# Patient Record
Sex: FEMALE | Race: OTHER | NOT HISPANIC OR LATINO
[De-identification: names, ages, dates, MRNs, and addresses within clinical notes are randomized per-mention and may not be internally consistent; named-entity substitution may affect disease eponyms.]

---

## 2017-05-01 ENCOUNTER — APPOINTMENT (OUTPATIENT)
Dept: SPINE | Facility: CLINIC | Age: 57
End: 2017-05-01

## 2017-05-01 VITALS
SYSTOLIC BLOOD PRESSURE: 137 MMHG | HEART RATE: 68 BPM | BODY MASS INDEX: 24.8 KG/M2 | WEIGHT: 140 LBS | HEIGHT: 63 IN | DIASTOLIC BLOOD PRESSURE: 84 MMHG

## 2017-05-01 RX ORDER — PANTOPRAZOLE 40 MG/1
40 TABLET, DELAYED RELEASE ORAL
Refills: 0 | Status: ACTIVE | COMMUNITY

## 2017-10-07 ENCOUNTER — RESULT REVIEW (OUTPATIENT)
Age: 57
End: 2017-10-07

## 2018-05-18 ENCOUNTER — APPOINTMENT (OUTPATIENT)
Dept: SPINE | Facility: CLINIC | Age: 58
End: 2018-05-18
Payer: MEDICAID

## 2018-05-18 VITALS — SYSTOLIC BLOOD PRESSURE: 131 MMHG | DIASTOLIC BLOOD PRESSURE: 83 MMHG | HEART RATE: 69 BPM

## 2018-05-18 VITALS
HEART RATE: 60 BPM | WEIGHT: 140 LBS | SYSTOLIC BLOOD PRESSURE: 163 MMHG | BODY MASS INDEX: 24.8 KG/M2 | HEIGHT: 63 IN | DIASTOLIC BLOOD PRESSURE: 102 MMHG

## 2018-05-18 PROCEDURE — 99213 OFFICE O/P EST LOW 20 MIN: CPT

## 2018-06-04 ENCOUNTER — APPOINTMENT (OUTPATIENT)
Dept: SPINE | Facility: CLINIC | Age: 58
End: 2018-06-04

## 2018-06-22 ENCOUNTER — EMERGENCY (EMERGENCY)
Facility: HOSPITAL | Age: 58
LOS: 1 days | End: 2018-06-22
Attending: EMERGENCY MEDICINE
Payer: MEDICAID

## 2018-06-22 VITALS
WEIGHT: 139.99 LBS | HEART RATE: 61 BPM | OXYGEN SATURATION: 98 % | RESPIRATION RATE: 18 BRPM | DIASTOLIC BLOOD PRESSURE: 80 MMHG | SYSTOLIC BLOOD PRESSURE: 165 MMHG | TEMPERATURE: 98 F

## 2018-06-22 DIAGNOSIS — Z98.51 TUBAL LIGATION STATUS: Chronic | ICD-10-CM

## 2018-06-22 PROCEDURE — 99283 EMERGENCY DEPT VISIT LOW MDM: CPT

## 2018-06-22 RX ORDER — IBUPROFEN 200 MG
600 TABLET ORAL ONCE
Qty: 0 | Refills: 0 | Status: COMPLETED | OUTPATIENT
Start: 2018-06-22 | End: 2018-06-22

## 2018-06-22 RX ADMIN — Medication 600 MILLIGRAM(S): at 09:12

## 2018-06-22 NOTE — ED ADULT NURSE NOTE - OBJECTIVE STATEMENT
Pt is a 57 yo female with the co left 1st toe pain that started last night, pt reports taking motrin at home last night that helped with the pain but it returned this am. Pt states that she started a new antibiotics for UTI and began feeling this pa8in, she denies anything like this happening to her in the past. She denies any CP or SOB no N/V/D no cough fever or chills.

## 2018-06-22 NOTE — ED PROVIDER NOTE - OBJECTIVE STATEMENT
58 YOF recently on abx for UTI p/w right first MTP joint pain and tenderness since last night. Pt denies any fevers, chills, back pain, chest pain, n/v/d. No hx of gout, no previous episodes. 58 YOF recently on abx for UTI p/w right first MTP joint pain and tenderness since last night. Pt denies any fevers, chills, back pain, chest pain, n/v/d. No hx of gout, no previous episodes.    Caraballo:  here for joint pain

## 2018-06-22 NOTE — ED PROVIDER NOTE - PHYSICAL EXAMINATION
Caraballo:  General: No distress.  Mentation at baseline.   HEENT: WNL  Chest/Lungs: CTAB, No wheeze, No retractions, No increased work of breathing, Normal rate  Heart: S1S2 RRR, No M/R/G, Pules equal Bilaterally in upper and lower extremities distally  Abd: soft, NT/ND, No guarding, No rebound.  No hernias, no palpable masses.  Extrem: FROM in all joints, no significant edema noted, No ulcers.  Cap refil < 2sec.  Skin: No rash noted, warm dry.  Neuro:  Grossly normal.  No difficulty ambulating. No focal deficits.  Psychiatric: No evidence of delusions. No SI/HI.

## 2018-06-22 NOTE — ED PROVIDER NOTE - NS ED ROS FT
CONSTITUTIONAL: No fevers, no chills  Eyes: no visual changes  Ears: no ear drainage, no ear pain  Nose: no nasal congestion  Mouth/Throat: no sore throat  Cardiovascular: No Chest pain  Respiratory: No SOB  Gastrointestinal: No n/v/d, no abd pain  Genitourinary: no dysuria, no hematuria  SKIN: no rashes.  NEURO: no headache  PSYCHIATRIC: no known mental health issues.  MSK: toe pain.

## 2018-06-22 NOTE — ED PROVIDER NOTE - CARE PLAN
Principal Discharge DX:	Gout  Assessment and plan of treatment:	1. Return to ED for worsening, progressive or any other concerning symptoms   2. Follow up with your primary care doctor in 2-3days  3. Take motrin 600mg every 6 hours as needed for pain

## 2018-06-22 NOTE — ED PROVIDER NOTE - ATTENDING CONTRIBUTION TO CARE
Ilya:  I have independently evaluated the patient and have documented in the appropriate sections above.  I agree with the exam and plan as noted above.

## 2018-06-22 NOTE — ED PROVIDER NOTE - PLAN OF CARE
1. Return to ED for worsening, progressive or any other concerning symptoms   2. Follow up with your primary care doctor in 2-3days  3. Take motrin 600mg every 6 hours as needed for pain

## 2019-05-13 ENCOUNTER — APPOINTMENT (OUTPATIENT)
Dept: SPINE | Facility: CLINIC | Age: 59
End: 2019-05-13
Payer: MEDICAID

## 2019-05-13 VITALS
DIASTOLIC BLOOD PRESSURE: 86 MMHG | HEART RATE: 78 BPM | BODY MASS INDEX: 26.4 KG/M2 | SYSTOLIC BLOOD PRESSURE: 124 MMHG | HEIGHT: 63 IN | WEIGHT: 149 LBS

## 2019-05-13 PROCEDURE — 99213 OFFICE O/P EST LOW 20 MIN: CPT

## 2019-05-13 NOTE — PHYSICAL EXAM
[FreeTextEntry5] : awake alert oriented [FreeTextEntry6] : no pronator drift [Abnormal Walk] : normal gait

## 2019-05-13 NOTE — PHYSICAL EXAM
[Abnormal Walk] : normal gait [FreeTextEntry5] : awake alert oriented [FreeTextEntry6] : no pronator drift

## 2019-05-13 NOTE — ASSESSMENT
[FreeTextEntry1] : no change in incidentalvexity meningioma. Neurologically intact followup MRI scan in one year

## 2019-05-13 NOTE — REASON FOR VISIT
[Follow-Up: _____] : a [unfilled] follow-up visit [FreeTextEntry1] : Surveillence of meningioma with follow up MRI Brain w and wo IVC

## 2019-07-21 ENCOUNTER — EMERGENCY (EMERGENCY)
Facility: HOSPITAL | Age: 59
LOS: 1 days | Discharge: HOME | End: 2019-07-21
Attending: STUDENT IN AN ORGANIZED HEALTH CARE EDUCATION/TRAINING PROGRAM
Payer: MEDICAID

## 2019-07-21 ENCOUNTER — EMERGENCY (EMERGENCY)
Facility: HOSPITAL | Age: 59
LOS: 1 days | Discharge: HOME | End: 2019-07-21
Attending: EMERGENCY MEDICINE
Payer: MEDICAID

## 2019-07-21 VITALS
SYSTOLIC BLOOD PRESSURE: 149 MMHG | HEIGHT: 63 IN | HEART RATE: 91 BPM | RESPIRATION RATE: 20 BRPM | DIASTOLIC BLOOD PRESSURE: 92 MMHG | WEIGHT: 149.03 LBS | TEMPERATURE: 98 F | OXYGEN SATURATION: 99 %

## 2019-07-21 VITALS
SYSTOLIC BLOOD PRESSURE: 148 MMHG | HEIGHT: 63 IN | OXYGEN SATURATION: 99 % | DIASTOLIC BLOOD PRESSURE: 85 MMHG | WEIGHT: 149.03 LBS | HEART RATE: 75 BPM | TEMPERATURE: 97 F | RESPIRATION RATE: 17 BRPM

## 2019-07-21 VITALS
OXYGEN SATURATION: 98 % | HEART RATE: 72 BPM | SYSTOLIC BLOOD PRESSURE: 129 MMHG | DIASTOLIC BLOOD PRESSURE: 80 MMHG | TEMPERATURE: 98 F | RESPIRATION RATE: 16 BRPM

## 2019-07-21 VITALS
DIASTOLIC BLOOD PRESSURE: 71 MMHG | HEART RATE: 63 BPM | RESPIRATION RATE: 16 BRPM | TEMPERATURE: 97 F | SYSTOLIC BLOOD PRESSURE: 118 MMHG | OXYGEN SATURATION: 99 %

## 2019-07-21 DIAGNOSIS — Z98.51 TUBAL LIGATION STATUS: Chronic | ICD-10-CM

## 2019-07-21 LAB
ALBUMIN SERPL ELPH-MCNC: 4.2 G/DL — SIGNIFICANT CHANGE UP (ref 3.3–5)
ALP SERPL-CCNC: 83 U/L — SIGNIFICANT CHANGE UP (ref 40–120)
ALT FLD-CCNC: 14 U/L — SIGNIFICANT CHANGE UP (ref 10–45)
ANION GAP SERPL CALC-SCNC: 14 MMOL/L — SIGNIFICANT CHANGE UP (ref 5–17)
AST SERPL-CCNC: 18 U/L — SIGNIFICANT CHANGE UP (ref 10–40)
BASE EXCESS BLDV CALC-SCNC: 0.5 MMOL/L — SIGNIFICANT CHANGE UP (ref -2–2)
BASOPHILS # BLD AUTO: 0 K/UL — SIGNIFICANT CHANGE UP (ref 0–0.2)
BASOPHILS NFR BLD AUTO: 0.4 % — SIGNIFICANT CHANGE UP (ref 0–2)
BILIRUB SERPL-MCNC: 0.6 MG/DL — SIGNIFICANT CHANGE UP (ref 0.2–1.2)
BUN SERPL-MCNC: 21 MG/DL — SIGNIFICANT CHANGE UP (ref 7–23)
CA-I SERPL-SCNC: 1.18 MMOL/L — SIGNIFICANT CHANGE UP (ref 1.12–1.3)
CALCIUM SERPL-MCNC: 9.3 MG/DL — SIGNIFICANT CHANGE UP (ref 8.4–10.5)
CHLORIDE BLDV-SCNC: 106 MMOL/L — SIGNIFICANT CHANGE UP (ref 96–108)
CHLORIDE SERPL-SCNC: 101 MMOL/L — SIGNIFICANT CHANGE UP (ref 96–108)
CO2 BLDV-SCNC: 26 MMOL/L — SIGNIFICANT CHANGE UP (ref 22–30)
CO2 SERPL-SCNC: 21 MMOL/L — LOW (ref 22–31)
CREAT SERPL-MCNC: 0.88 MG/DL — SIGNIFICANT CHANGE UP (ref 0.5–1.3)
EOSINOPHIL # BLD AUTO: 0.2 K/UL — SIGNIFICANT CHANGE UP (ref 0–0.5)
EOSINOPHIL NFR BLD AUTO: 2.1 % — SIGNIFICANT CHANGE UP (ref 0–6)
GAS PNL BLDV: 135 MMOL/L — SIGNIFICANT CHANGE UP (ref 135–145)
GAS PNL BLDV: SIGNIFICANT CHANGE UP
GLUCOSE BLDV-MCNC: 169 MG/DL — HIGH (ref 70–99)
GLUCOSE SERPL-MCNC: 167 MG/DL — HIGH (ref 70–99)
HCO3 BLDV-SCNC: 25 MMOL/L — SIGNIFICANT CHANGE UP (ref 21–29)
HCT VFR BLD CALC: 40.2 % — SIGNIFICANT CHANGE UP (ref 34.5–45)
HCT VFR BLDA CALC: 40 % — SIGNIFICANT CHANGE UP (ref 39–50)
HGB BLD CALC-MCNC: 13 G/DL — SIGNIFICANT CHANGE UP (ref 11.5–15.5)
HGB BLD-MCNC: 13.3 G/DL — SIGNIFICANT CHANGE UP (ref 11.5–15.5)
LACTATE BLDV-MCNC: 1.6 MMOL/L — SIGNIFICANT CHANGE UP (ref 0.7–2)
LIDOCAIN IGE QN: 39 U/L — SIGNIFICANT CHANGE UP (ref 7–60)
LYMPHOCYTES # BLD AUTO: 1.6 K/UL — SIGNIFICANT CHANGE UP (ref 1–3.3)
LYMPHOCYTES # BLD AUTO: 14.1 % — SIGNIFICANT CHANGE UP (ref 13–44)
MCHC RBC-ENTMCNC: 31 PG — SIGNIFICANT CHANGE UP (ref 27–34)
MCHC RBC-ENTMCNC: 33 GM/DL — SIGNIFICANT CHANGE UP (ref 32–36)
MCV RBC AUTO: 94 FL — SIGNIFICANT CHANGE UP (ref 80–100)
MONOCYTES # BLD AUTO: 0.4 K/UL — SIGNIFICANT CHANGE UP (ref 0–0.9)
MONOCYTES NFR BLD AUTO: 3.6 % — SIGNIFICANT CHANGE UP (ref 2–14)
NEUTROPHILS # BLD AUTO: 8.8 K/UL — HIGH (ref 1.8–7.4)
NEUTROPHILS NFR BLD AUTO: 79.7 % — HIGH (ref 43–77)
OTHER CELLS CSF MANUAL: 9 ML/DL — LOW (ref 18–22)
PCO2 BLDV: 43 MMHG — SIGNIFICANT CHANGE UP (ref 35–50)
PH BLDV: 7.38 — SIGNIFICANT CHANGE UP (ref 7.35–7.45)
PLATELET # BLD AUTO: 248 K/UL — SIGNIFICANT CHANGE UP (ref 150–400)
PO2 BLDV: 30 MMHG — SIGNIFICANT CHANGE UP (ref 25–45)
POTASSIUM BLDV-SCNC: 4.2 MMOL/L — SIGNIFICANT CHANGE UP (ref 3.5–5.3)
POTASSIUM SERPL-MCNC: 4.3 MMOL/L — SIGNIFICANT CHANGE UP (ref 3.5–5.3)
POTASSIUM SERPL-SCNC: 4.3 MMOL/L — SIGNIFICANT CHANGE UP (ref 3.5–5.3)
PROT SERPL-MCNC: 7.4 G/DL — SIGNIFICANT CHANGE UP (ref 6–8.3)
RBC # BLD: 4.27 M/UL — SIGNIFICANT CHANGE UP (ref 3.8–5.2)
RBC # FLD: 11.5 % — SIGNIFICANT CHANGE UP (ref 10.3–14.5)
SAO2 % BLDV: 50 % — LOW (ref 67–88)
SODIUM SERPL-SCNC: 136 MMOL/L — SIGNIFICANT CHANGE UP (ref 135–145)
TROPONIN T, HIGH SENSITIVITY RESULT: <6 NG/L — SIGNIFICANT CHANGE UP (ref 0–51)
TROPONIN T, HIGH SENSITIVITY RESULT: <6 NG/L — SIGNIFICANT CHANGE UP (ref 0–51)
WBC # BLD: 11.1 K/UL — HIGH (ref 3.8–10.5)
WBC # FLD AUTO: 11.1 K/UL — HIGH (ref 3.8–10.5)

## 2019-07-21 PROCEDURE — 84132 ASSAY OF SERUM POTASSIUM: CPT

## 2019-07-21 PROCEDURE — 99283 EMERGENCY DEPT VISIT LOW MDM: CPT | Mod: 25

## 2019-07-21 PROCEDURE — 85027 COMPLETE CBC AUTOMATED: CPT

## 2019-07-21 PROCEDURE — 99283 EMERGENCY DEPT VISIT LOW MDM: CPT

## 2019-07-21 PROCEDURE — 96374 THER/PROPH/DIAG INJ IV PUSH: CPT

## 2019-07-21 PROCEDURE — 83690 ASSAY OF LIPASE: CPT

## 2019-07-21 PROCEDURE — 96372 THER/PROPH/DIAG INJ SC/IM: CPT

## 2019-07-21 PROCEDURE — 93005 ELECTROCARDIOGRAM TRACING: CPT

## 2019-07-21 PROCEDURE — 80053 COMPREHEN METABOLIC PANEL: CPT

## 2019-07-21 PROCEDURE — 96375 TX/PRO/DX INJ NEW DRUG ADDON: CPT

## 2019-07-21 PROCEDURE — 93010 ELECTROCARDIOGRAM REPORT: CPT

## 2019-07-21 PROCEDURE — 99284 EMERGENCY DEPT VISIT MOD MDM: CPT

## 2019-07-21 PROCEDURE — 99284 EMERGENCY DEPT VISIT MOD MDM: CPT | Mod: 25

## 2019-07-21 PROCEDURE — 82947 ASSAY GLUCOSE BLOOD QUANT: CPT

## 2019-07-21 PROCEDURE — 73630 X-RAY EXAM OF FOOT: CPT | Mod: 26,LT

## 2019-07-21 PROCEDURE — 82330 ASSAY OF CALCIUM: CPT

## 2019-07-21 PROCEDURE — 70450 CT HEAD/BRAIN W/O DYE: CPT | Mod: 26

## 2019-07-21 PROCEDURE — 84484 ASSAY OF TROPONIN QUANT: CPT

## 2019-07-21 PROCEDURE — 82803 BLOOD GASES ANY COMBINATION: CPT

## 2019-07-21 PROCEDURE — 85014 HEMATOCRIT: CPT

## 2019-07-21 PROCEDURE — 73630 X-RAY EXAM OF FOOT: CPT

## 2019-07-21 PROCEDURE — 82435 ASSAY OF BLOOD CHLORIDE: CPT

## 2019-07-21 PROCEDURE — 83605 ASSAY OF LACTIC ACID: CPT

## 2019-07-21 PROCEDURE — 70450 CT HEAD/BRAIN W/O DYE: CPT

## 2019-07-21 PROCEDURE — 84295 ASSAY OF SERUM SODIUM: CPT

## 2019-07-21 RX ORDER — DIPHENHYDRAMINE HCL 50 MG
25 CAPSULE ORAL ONCE
Refills: 0 | Status: COMPLETED | OUTPATIENT
Start: 2019-07-21 | End: 2019-07-21

## 2019-07-21 RX ORDER — IBUPROFEN 200 MG
400 TABLET ORAL ONCE
Refills: 0 | Status: DISCONTINUED | OUTPATIENT
Start: 2019-07-21 | End: 2019-07-21

## 2019-07-21 RX ORDER — SODIUM CHLORIDE 9 MG/ML
1000 INJECTION INTRAMUSCULAR; INTRAVENOUS; SUBCUTANEOUS ONCE
Refills: 0 | Status: DISCONTINUED | OUTPATIENT
Start: 2019-07-21 | End: 2019-07-21

## 2019-07-21 RX ORDER — FAMOTIDINE 10 MG/ML
20 INJECTION INTRAVENOUS ONCE
Refills: 0 | Status: COMPLETED | OUTPATIENT
Start: 2019-07-21 | End: 2019-07-21

## 2019-07-21 RX ORDER — KETOROLAC TROMETHAMINE 30 MG/ML
15 SYRINGE (ML) INJECTION ONCE
Refills: 0 | Status: DISCONTINUED | OUTPATIENT
Start: 2019-07-21 | End: 2019-07-21

## 2019-07-21 RX ORDER — IBUPROFEN 200 MG
400 TABLET ORAL ONCE
Refills: 0 | Status: COMPLETED | OUTPATIENT
Start: 2019-07-21 | End: 2019-07-21

## 2019-07-21 RX ORDER — METOCLOPRAMIDE HCL 10 MG
10 TABLET ORAL ONCE
Refills: 0 | Status: COMPLETED | OUTPATIENT
Start: 2019-07-21 | End: 2019-07-21

## 2019-07-21 RX ORDER — OXYCODONE HYDROCHLORIDE 5 MG/1
5 TABLET ORAL ONCE
Refills: 0 | Status: DISCONTINUED | OUTPATIENT
Start: 2019-07-21 | End: 2019-07-21

## 2019-07-21 RX ADMIN — Medication 15 MILLIGRAM(S): at 08:18

## 2019-07-21 RX ADMIN — OXYCODONE HYDROCHLORIDE 5 MILLIGRAM(S): 5 TABLET ORAL at 06:55

## 2019-07-21 RX ADMIN — Medication 30 MILLILITER(S): at 12:13

## 2019-07-21 RX ADMIN — Medication 400 MILLIGRAM(S): at 06:58

## 2019-07-21 RX ADMIN — Medication 10 MILLIGRAM(S): at 11:12

## 2019-07-21 RX ADMIN — Medication 25 MILLIGRAM(S): at 11:12

## 2019-07-21 RX ADMIN — FAMOTIDINE 20 MILLIGRAM(S): 10 INJECTION INTRAVENOUS at 12:14

## 2019-07-21 RX ADMIN — Medication 40 MILLIGRAM(S): at 08:18

## 2019-07-21 NOTE — ED ADULT NURSE NOTE - OBJECTIVE STATEMENT
59 year old female PMH of menigioma presents to ED c/o headache, dizziness, N/V about 15 minutes after discharge form ED today.  She also c/o feeling like something is stuck in her throat.  Patient was seen and treated for gout and discharged.  She said about 15 minutes after discharge she became dizzy had a headache, nausea and 2 episodes of vomiting.  She denies: falls, fevers, chest pain, shortness of breath.

## 2019-07-21 NOTE — ED PROVIDER NOTE - ATTENDING CONTRIBUTION TO CARE
59 F w/ PMH of meningioma p/w nausea and vomiting and headache s/p prednisone. Pt states that she went home after being seen in the ED and D/c'd for gout.   Pt reports that she has a prior hx of mengioma.

## 2019-07-21 NOTE — ED ADULT NURSE NOTE - INTERVENTIONS DEFINITIONS
Physically safe environment: no spills, clutter or unnecessary equipment/Stretcher in lowest position, wheels locked, appropriate side rails in place/Provide visual cue, wrist band, yellow gown, etc.

## 2019-07-21 NOTE — ED PROVIDER NOTE - PHYSICAL EXAMINATION
GEN APPEARANCE: WDWN, alert and cooperative, non-toxic appearing and in NAD, uncomfortable appearing   HEAD: Atraumatic, normocephalic   EYES: PERRLa, EOMI, vision grossly intact.   EARS: Gross hearing intact.   NOSE: No nasal discharge, no external evidence of epistaxis.   NECK: Supple  CV: RRR, S1S2, no c/r/m/g. No cyanosis or pallor. Extremities warm, well perfused. Cap refill <2 seconds. No bruits.   LUNGS: CTAB. No wheezing. No rales. No rhonchi. No diminished breath sounds.   ABDOMEN: Soft, NTND. No guarding or rebound. No masses.   MSK: Spine appears normal, no spine point tenderness. No CVA ttp. No joint erythema or tenderness. Normal muscular development. Pelvis stable.  EXTREMITIES: No peripheral edema. No obvious joint or bony deformity. L 1st base erythematous tender warm   NEURO: Alert, follows commands. Weight bearing normal. Speech normal. Sensation and motor normal x4 extremities.   SKIN: Normal color for race, warm, dry and intact. No evidence of rash.  PSYCH: Normal mood and affect.

## 2019-07-21 NOTE — ED PROVIDER NOTE - SHIFT CHANGE DETAILS
pt signed out to me pending reassessment of pain, pt has hx of gout in the R toe now here w/ L great toe pain in the 1st MCP, took husbands allopurinol today,

## 2019-07-21 NOTE — ED PROVIDER NOTE - OBJECTIVE STATEMENT
59f w hx gout just discharged from ED ~30m ago c/o toe pain dx as acute gouty flare, meningioma followed by nsgy here now for n/v, headache and lightheadedness and feeling sweaty. Pt states she has similar headaches frequently 2/2 meningioma which she did not mention on previous visit, as was unrelated to her complaint. States vomited 2x. Also felt whole body numbness that has improved, most significant in RUE. Had been given NSAIDs for gout and states didn't eat anything since. Denies abd pain, fever, urinary sx. No cp or SOB, though feels sensation of choking in throat. States she was feeling lightheaded prior to receiving medications on last visit but medical staff not made aware.

## 2019-07-21 NOTE — ED PROVIDER NOTE - CLINICAL SUMMARY MEDICAL DECISION MAKING FREE TEXT BOX
Eros PGY3: 59F hx of gout presents with gout to L 1st toe worsening pain x4 days is able to ambulate, no trauma, falls, took 200mg motrin prior to ED arrival. exam vss non toxic appearing 1st L tender erythematous c/f gout vs fx will get xr pain control reassess Eros PGY3: 59F hx of gout presents with gout to L 1st toe worsening pain x4 days is able to ambulate, no trauma, falls, took 200mg motrin prior to ED arrival. exam vss non toxic appearing 1st L tender erythematous c/f gout vs fx will get xr pain control reassess    Patient presenting with acute gout flare. Will provide analgesia, xray of foot, likely discharge home with NSAIDs and follow up with PMD.  ATTG: Dr. Bullard

## 2019-07-21 NOTE — ED ADULT NURSE REASSESSMENT NOTE - NS ED NURSE REASSESS COMMENT FT1
Patient awake and alert with family at bedside aware of plan of care and reports pain in left great toe.

## 2019-07-21 NOTE — ED PROVIDER NOTE - CLINICAL SUMMARY MEDICAL DECISION MAKING FREE TEXT BOX
59f w hx gout just discharged from ED ~30m ago c/o toe pain dx as acute gouty flare, meningioma followed by nsgy here now for n/v, headache and lightheadedness and feeling sweaty. Pt appears well NAD w nl VS. Soft NT abd and non-focal neuro exam. Sx possibly migrainous in nature vs gastritic given recent NSAID administration. Will check ekg, labs and cth, treat sx, r/a

## 2019-07-21 NOTE — ED PROVIDER NOTE - PROGRESS NOTE DETAILS
Elizabeth Goldberger PGY-3: CT unrem other than for stable calcified meningioma. Pt feeling better and appears well. States has had similar sx in past from steroids. Will dc steroids that were sent for gout and dc

## 2019-07-21 NOTE — ED PROVIDER NOTE - NSFOLLOWUPINSTRUCTIONS_ED_ALL_ED_FT
What is gout?  Gout is a form of arthritis. It can cause pain and swelling in the joints. At first, it tends to affect only one joint – most frequently the big toe. It happens in people who have too much uric acid in the blood. Uric acid is a chemical that is produced when the body breaks down certain foods. Uric acid can form sharp needle-like crystals that build up in the joints and cause pain. Uric acid crystals can also form inside the tubes that carry urine from the kidneys to the bladder. These crystals can turn into "kidney stones" that can cause pain and problems with the flow of urine.    What are the symptoms of gout?  People with gout get sudden "flares" or attacks of severe pain, most often the big toe, ankle, or knee. Often the joint also turns red and swells. Usually, only one joint is affected, but some people have pain in more than one joint. Gout flares tend to happen more often during the night.    The pain from gout can be extreme. The pain and swelling are worst at the beginning of a gout flare. The symptoms then get better within a few days to weeks. It is not clear how the body "turns off" a gout flare.    Is there a test for gout?  Yes. To test you for gout, your doctor or nurse can take a sample of fluid from the joint that is in pain. If he or she finds typical gout crystals in the fluid, then you have gout. Even without checking fluid from a joint, the doctor or nurse might still strongly suspect gout if:    ?You have had pain and swelling in one joint, especially the joint at the base of the big toe    ?Your symptoms completely go away between flares, at least when you first start having them    ?Your blood tests show high levels of uric acid    How is gout treated?  There are a few medicines that can reduce the pain and swelling caused by gout. When you find one that works for you, make sure to keep it on hand all the time. That way you can take it as soon you feel a flare starting. Gout medicines work best if you take them as soon as symptoms start.    The medicines used to treat gout flares include:    ?NSAIDs – This is a large group of medicines that includes ibuprofen (sample brand names: Advil, Motrin) and indomethacin (brand name: Indocin). NSAIDs might not be safe for people with kidney or liver disease, or for people who have bleeding problems.    ?Colchicine – This medicine helps with gout but it can also cause diarrhea, nausea, vomiting, and stomach pain.    ?Steroids – Steroids can reduce swelling and pain. These steroids are not the kind that athletes take to build up muscle. Steroids can be taken as pills or as shots.    Are there medicines to prevent gout flares?  Yes, there are medicines that can reduce the chances of having future gout flares. Most people who have repeated or severe flares of gout need to take these medicines. In general, they all work by reducing the amount of uric acid in the blood. Examples of these medicines include allopurinol (brand names: Aloprim, Zyloprim), febuxostat (brand name: Uloric), and probenecid. People with severe gout can also get a medicine called pegloticase (brand name: Krystexxa), which is given through a vein. This medicine can cause an allergic reaction in some people.    If you take one of the medicines to prevent gout, your doctor or nurse will want to make sure you use it safely. He or she might also want to check that your uric acid level gets low enough to dissolve the gout crystals. Allopurinol, febuxostat, and probenecid can actually increase gout flares when you first start taking them. To prevent these flares, your doctor or nurse might suggest that you take low doses of colchicine when you start the medicines. This will give the gout crystals time to dissolve, and that will put a stop to the flares over time. If you do have a gout flare, it's important to keep taking your daily medicines normally.    Your doctor will check your uric acid levels regularly. This is to make sure the medicines are working and you are taking the right dose.    Can I do anything on my own to prevent gout flares?  Yes. If you are overweight, losing weight can help relieve gout.    It's not clear that following a specific diet plan will help with gout symptoms. But eating a balanced diet can help improve your overall health. It can also help you lose weight, if you are overweight.    In general, a healthy diet includes plenty of fruits, vegetables, whole grains, and low-fat dairy products. You should also limit sugary drinks and alcohol, which can make gout flares worse.    Some people with gout also have other health problems, such as heart disease, high blood pressure, kidney disease, or obesity. If you have any of these issues, it's important to work with your doctor to manage them. This can help improve your overall health and might also help with your gout. You were seen in the emergency department for toe pain. Please follow up with your primary doctor in the next 3-5 days. Take prednisone 40 mg once a day for the next 5 days. Also take motrin 800mg up to 3 times a day for continued pain. Return to the emergency department immediately if you experience fever, redness of the toe or any other concerning symptoms.        What is gout?  Gout is a form of arthritis. It can cause pain and swelling in the joints. At first, it tends to affect only one joint – most frequently the big toe. It happens in people who have too much uric acid in the blood. Uric acid is a chemical that is produced when the body breaks down certain foods. Uric acid can form sharp needle-like crystals that build up in the joints and cause pain. Uric acid crystals can also form inside the tubes that carry urine from the kidneys to the bladder. These crystals can turn into "kidney stones" that can cause pain and problems with the flow of urine.    What are the symptoms of gout?  People with gout get sudden "flares" or attacks of severe pain, most often the big toe, ankle, or knee. Often the joint also turns red and swells. Usually, only one joint is affected, but some people have pain in more than one joint. Gout flares tend to happen more often during the night.    The pain from gout can be extreme. The pain and swelling are worst at the beginning of a gout flare. The symptoms then get better within a few days to weeks. It is not clear how the body "turns off" a gout flare.    Is there a test for gout?  Yes. To test you for gout, your doctor or nurse can take a sample of fluid from the joint that is in pain. If he or she finds typical gout crystals in the fluid, then you have gout. Even without checking fluid from a joint, the doctor or nurse might still strongly suspect gout if:    ?You have had pain and swelling in one joint, especially the joint at the base of the big toe    ?Your symptoms completely go away between flares, at least when you first start having them    ?Your blood tests show high levels of uric acid    How is gout treated?  There are a few medicines that can reduce the pain and swelling caused by gout. When you find one that works for you, make sure to keep it on hand all the time. That way you can take it as soon you feel a flare starting. Gout medicines work best if you take them as soon as symptoms start.    The medicines used to treat gout flares include:    ?NSAIDs – This is a large group of medicines that includes ibuprofen (sample brand names: Advil, Motrin) and indomethacin (brand name: Indocin). NSAIDs might not be safe for people with kidney or liver disease, or for people who have bleeding problems.    ?Colchicine – This medicine helps with gout but it can also cause diarrhea, nausea, vomiting, and stomach pain.    ?Steroids – Steroids can reduce swelling and pain. These steroids are not the kind that athletes take to build up muscle. Steroids can be taken as pills or as shots.    Are there medicines to prevent gout flares?  Yes, there are medicines that can reduce the chances of having future gout flares. Most people who have repeated or severe flares of gout need to take these medicines. In general, they all work by reducing the amount of uric acid in the blood. Examples of these medicines include allopurinol (brand names: Aloprim, Zyloprim), febuxostat (brand name: Uloric), and probenecid. People with severe gout can also get a medicine called pegloticase (brand name: Krystexxa), which is given through a vein. This medicine can cause an allergic reaction in some people.    If you take one of the medicines to prevent gout, your doctor or nurse will want to make sure you use it safely. He or she might also want to check that your uric acid level gets low enough to dissolve the gout crystals. Allopurinol, febuxostat, and probenecid can actually increase gout flares when you first start taking them. To prevent these flares, your doctor or nurse might suggest that you take low doses of colchicine when you start the medicines. This will give the gout crystals time to dissolve, and that will put a stop to the flares over time. If you do have a gout flare, it's important to keep taking your daily medicines normally.    Your doctor will check your uric acid levels regularly. This is to make sure the medicines are working and you are taking the right dose.    Can I do anything on my own to prevent gout flares?  Yes. If you are overweight, losing weight can help relieve gout.    It's not clear that following a specific diet plan will help with gout symptoms. But eating a balanced diet can help improve your overall health. It can also help you lose weight, if you are overweight.    In general, a healthy diet includes plenty of fruits, vegetables, whole grains, and low-fat dairy products. You should also limit sugary drinks and alcohol, which can make gout flares worse.    Some people with gout also have other health problems, such as heart disease, high blood pressure, kidney disease, or obesity. If you have any of these issues, it's important to work with your doctor to manage them. This can help improve your overall health and might also help with your gout.

## 2019-07-21 NOTE — ED PROVIDER NOTE - NSFOLLOWUPCLINICS_GEN_ALL_ED_FT
[facility] - Primary Care  Primary Care  [address] [place] [address]  Phone: [phone]  Fax:   Follow Up Time: 1-3 Days

## 2019-07-21 NOTE — ED ADULT NURSE NOTE - OBJECTIVE STATEMENT
c/o pain to left great toe; pt states it is gout, which she has had in the past; she took allopurinol and Motrin at home that did not help; toe has erythema and minor swelling; pt states she is unable to walk; denies n/v or trauma to area. c/o pain to left great toe; pt states it is gout, which she has had in the past; she took allopurinol and 200 mg Motrin at home that did not help; toe has erythema and minor swelling; pt states she is unable to walk; denies n/v or trauma to area.

## 2019-07-21 NOTE — ED PROVIDER NOTE - PROGRESS NOTE DETAILS
Elizabeth Goldberger PGY-3: pt signed out to me pending reassessment of pain. States will w pain only mildly improved after motrin 400 and oxy 5. Will give another dose motrin to complete to high-dose and r/a Elizabeth Goldberger PGY-3: pt signed out to me pending reassessment of pain. States will w pain only mildly improved after motrin 400 and oxy 5. Will give low-dose IM toradol and r/a Savanah Mckoy MD  Pt was counselled on gout. Pt will have crutches and then discharge home.  has gout as well Elizabeth Goldberger PGY-3: pt signed out to me pending reassessment of pain. States will w pain only mildly improved after motrin 400 and oxy 5. Will give low-dose IM toradol + steroid and r/a

## 2019-07-21 NOTE — ED ADULT TRIAGE NOTE - CHIEF COMPLAINT QUOTE
pt c/o n/v with dizziness and LUE numbness s/p taking meds for gout.  pt was d/c'd approz 20 min ago for gout tx.

## 2019-09-05 ENCOUNTER — APPOINTMENT (OUTPATIENT)
Dept: RHEUMATOLOGY | Facility: CLINIC | Age: 59
End: 2019-09-05

## 2019-09-09 ENCOUNTER — EMERGENCY (EMERGENCY)
Facility: HOSPITAL | Age: 59
LOS: 1 days | Discharge: HOME | End: 2019-09-09
Attending: EMERGENCY MEDICINE
Payer: MEDICAID

## 2019-09-09 VITALS
WEIGHT: 143.08 LBS | RESPIRATION RATE: 18 BRPM | SYSTOLIC BLOOD PRESSURE: 134 MMHG | HEIGHT: 63 IN | DIASTOLIC BLOOD PRESSURE: 86 MMHG | OXYGEN SATURATION: 100 % | TEMPERATURE: 98 F | HEART RATE: 89 BPM

## 2019-09-09 DIAGNOSIS — Z98.51 TUBAL LIGATION STATUS: Chronic | ICD-10-CM

## 2019-09-09 PROBLEM — M10.9 GOUT, UNSPECIFIED: Chronic | Status: ACTIVE | Noted: 2019-07-21

## 2019-09-09 PROCEDURE — 73620 X-RAY EXAM OF FOOT: CPT | Mod: 26,LT

## 2019-09-09 PROCEDURE — 99283 EMERGENCY DEPT VISIT LOW MDM: CPT

## 2019-09-09 PROCEDURE — 73620 X-RAY EXAM OF FOOT: CPT

## 2019-09-09 RX ORDER — INDOMETHACIN 50 MG
50 CAPSULE ORAL ONCE
Refills: 0 | Status: COMPLETED | OUTPATIENT
Start: 2019-09-09 | End: 2019-09-09

## 2019-09-09 RX ORDER — INDOMETHACIN 50 MG
1 CAPSULE ORAL
Qty: 30 | Refills: 0
Start: 2019-09-09 | End: 2019-09-18

## 2019-09-09 RX ADMIN — Medication 50 MILLIGRAM(S): at 05:39

## 2019-09-09 NOTE — ED PROVIDER NOTE - PROGRESS NOTE DETAILS
Patient reports toe pain has resolved. Will follow up with PMD regarding her recurrent gout flares. Strict return precautions given.

## 2019-09-09 NOTE — ED ADULT NURSE NOTE - OBJECTIVE STATEMENT
58 y/o Female presenting to the ED ambulatory, A&Ox3, complaining of left foot pain due to gout. Pt reports the pain has been increasingly getting worse with no relief from pain medication at home. Left foot appears erythematous. Pt denies chest pain, shortness of breath, fever, chills, numbness, tingling. Pt able to ambulate steadily. Pulses present in effected extremity. Cap refill 2 seconds. Safety and comfort measures provided and maintained, bed in lowest position. Awaiting MD orders at this time.

## 2019-09-09 NOTE — ED PROVIDER NOTE - PATIENT PORTAL LINK FT
You can access the FollowMyHealth Patient Portal offered by [facility] by registering at the following website: [email or web address]. By joining [facility]’s FollowMyHealth portal, you will also be able to view your health information using other applications (apps) compatible with our system.

## 2019-09-09 NOTE — ED PROVIDER NOTE - ATTENDING CONTRIBUTION TO CARE
59F presenting to the ED w/ L 1st toe pain, non-radiating, exacerbated by palpation, alleviated with rest, feels like prior gout flares, sharp. Denies any trauma to the area. States that she has had this before. Tried Ibuprofen 600mg at home without relief.    Chart review shows prior visits for gout flares similar to this one.     ROS:  GEN: (-) fevers/chills  HEENT: (-) visual change  NECK: (-) stiffness, (-) swelling  RESP: (-) shortness of breath, (-) cough, (-) sputum  CV: (-) chest pain, (-) palpitations  GI: (-) nausea, (-) vomiting, (-) pain, (-) constipation, (-) diarrhea  : (-) frequency/urgency, (-) hematuria, (-) dysuria, (-) incontinence, (-) discharge  EXT: (-) edema, (-) cyanosis, (+) toe pain  ENDO: (-) heat/cold intolerance, (-) polyuria  NEURO: (-) paresthesias, (-) weakness, (-) headache, (-) dizziness, (-) syncope  MSK: no muscle pain   SKIN: (-) erythema    PMHx: Gout, Meningioma  PSHx: Tubal ligation  Allergies: ?Colchicine, ?Prednisone  SocHx: Denies smoking, drinking, drugs.    PHYSICAL EXAMINATION:  VITALS REVIEWED.  VS slightly hypertensive, otherwise normal  GENERALIZED APPEARANCE:  Comfortable, no acute distress, ambulating without difficulty  SKIN:  Warm, dry, no cyanosis  HEAD:  No obvious scalp lesions  EYES:  Conjunctiva pink, no icterus  ENMT:  Mucus membranes moist, no stridor  NECK:  Supple, non-tender  CHEST AND RESPIRATORY:  Clear to auscultation B/L, good air entry B/L, equal chest expansion  HEART AND CARDIOVASCULAR:  Regular rate, no obvious murmur  ABDOMEN AND GI:  Soft, non-tender, non-distended.  No rebound, no guarding  EXTREMITIES:  No deformity, edema, or calf tenderness;  L toe joint tender on palpation without evidence of swelling or warmth, no erythema, no lesions    Impression:  Mild gout flare, r/o fx, r/o effusion/gas although unlikely  Indomethacin, XR, re-eval  NEURO: AAOx3, gross motor and sensory intact

## 2019-09-09 NOTE — ED PROVIDER NOTE - NS ED ROS FT
CONSTITUTIONAL:  No weight loss, fever, chills, weakness or fatigue.  HEENT:  Eyes:  No visual loss, blurred vision, double vision or yellow sclerae. Ears, Nose, Throat:  No hearing loss, sneezing, congestion, runny nose or sore throat.  SKIN:  No rash or itching.  CARDIOVASCULAR:  No chest pain, chest pressure or chest discomfort. No palpitations or edema.  RESPIRATORY:  No shortness of breath, cough or sputum.  GASTROINTESTINAL:  No anorexia, nausea, vomiting or diarrhea. No abdominal pain or blood.  GENITOURINARY:  Denies hematuria, dysuria.   NEUROLOGICAL:  No headache, dizziness, syncope, paralysis, ataxia, numbness or tingling in the extremities. No change in bowel or bladder control.  MUSCULOSKELETAL:  No muscle, back pain, joint pain or stiffness. + L toe pain  HEMATOLOGIC:  No anemia, bleeding or bruising.  LYMPHATICS:  No enlarged nodes. No history of splenectomy.  PSYCHIATRIC:  No history of depression or anxiety.  ENDOCRINOLOGIC:  No reports of sweating, cold or heat intolerance. No polyuria or polydipsia.  ALLERGIES:  No history of asthma, hives, eczema or rhinitis.

## 2019-09-09 NOTE — ED PROVIDER NOTE - NSFOLLOWUPINSTRUCTIONS_ED_ALL_ED_FT
We prescribed you Indomethacin.  Take 1 pill every 8 hours until symptoms have resolved for 2-3 days.  Make sure to take this medication with food.     Gout  Image   Gout is painful swelling that can occur in some of your joints. Gout is a type of arthritis. This condition is caused by having too much uric acid in your body. Uric acid is a chemical that forms when your body breaks down substances called purines. Purines are important for building body proteins.    When your body has too much uric acid, sharp crystals can form and build up inside your joints. This causes pain and swelling. Gout attacks can happen quickly and be very painful (acute gout). Over time, the attacks can affect more joints and become more frequent (chronic gout). Gout can also cause uric acid to build up under your skin and inside your kidneys.    What are the causes?  This condition is caused by too much uric acid in your blood. This can occur because:  Your kidneys do not remove enough uric acid from your blood. This is the most common cause.  Your body makes too much uric acid. This can occur with some cancers and cancer treatments. It can also occur if your body is breaking down too many red blood cells (hemolytic anemia).  You eat too many foods that are high in purines. These foods include organ meats and some seafood. Alcohol, especially beer, is also high in purines.  A gout attack may be triggered by trauma or stress.    What increases the risk?  This condition is more likely to develop in people who:  Have a family history of gout.  Are male and middle-aged.  Are female and have gone through menopause.  Are obese.  Frequently drink alcohol, especially beer.  Are dehydrated.  Lose weight too quickly.  Have an organ transplant.  Have lead poisoning.  Take certain medicines, including aspirin, cyclosporine, diuretics, levodopa, and niacin.  Have kidney disease or psoriasis.  What are the signs or symptoms?  ImageAn attack of acute gout happens quickly. It usually occurs in just one joint. The most common place is the big toe. Attacks often start at night. Other joints that may be affected include joints of the feet, ankle, knee, fingers, wrist, or elbow. Symptoms may include:  Severe pain.  Warmth.  Swelling.  Stiffness.  Tenderness. The affected joint may be very painful to touch.  Shiny, red, or purple skin.  Chills and fever.  Chronic gout may cause symptoms more frequently. More joints may be involved. You may also have white or yellow lumps (tophi) on your hands or feet or in other areas near your joints.    How is this diagnosed?  This condition is diagnosed based on your symptoms, medical history, and physical exam. You may have tests, such as:  Blood tests to measure uric acid levels.  Removal of joint fluid with a needle (aspiration) to look for uric acid crystals.  X-rays to look for joint damage.  How is this treated?  Treatment for this condition has two phases: treating an acute attack and preventing future attacks. Acute gout treatment may include medicines to reduce pain and swelling, including:  NSAIDs.  Steroids. These are strong anti-inflammatory medicines that can be taken by mouth (orally) or injected into a joint.  Colchicine. This medicine relieves pain and swelling when it is taken soon after an attack. It can be given orally or through an IV tube.  Preventive treatment may include:  Daily use of smaller doses of NSAIDs or colchicine.  Use of a medicine that reduces uric acid levels in your blood.  Changes to your diet. You may need to see a specialist about healthy eating (dietitian).  Follow these instructions at home:  During a gout attack     Image   If directed, apply ice to the affected area:  Put ice in a plastic bag.  Place a towel between your skin and the bag.  Leave the ice on for 20 minutes, 2–3 times a day.  Rest the joint as much as possible. If the affected joint is in your leg, you may be given crutches to use.  Raise (elevate) the affected joint above the level of your heart as often as possible.  Drink enough fluids to keep your urine pale yellow.  Take over-the-counter and prescription medicines only as told by your health care provider.  Do not drive or operate heavy machinery while taking prescription pain medicine.  Follow instructions from your health care provider about eating or drinking restrictions.  Return to your normal activities as told by your health care provider. Ask your health care provider what activities are safe for you.  Avoiding future gout attacks     Image   Follow a low-purine diet as told by your dietitian or health care provider. Avoid foods and drinks that are high in purines, including liver, kidney, anchovies, asparagus, herring, mushrooms, mussels, and beer.  Limit alcohol intake to no more than 1 drink a day for nonpregnant women and 2 drinks a day for men. One drink equals 12 oz of beer, 5 oz of wine, or 1½ oz of hard liquor.  Maintain a healthy weight or lose weight if you are overweight. If you want to lose weight, talk with your health care provider. It is important that you do not lose weight too quickly.  Start or maintain an exercise program as told by your health care provider.  Drink enough fluids to keep your urine pale yellow.  Take over-the-counter and prescription medicines only as told by your health care provider.  Keep all follow-up visits as told by your health care provider. This is important.  Contact a health care provider if:  You have another gout attack.  You continue to have symptoms of a gout attack after10 days of treatment.  You have side effects from your medicines.  You have chills or a fever.  You have burning pain when you urinate.  You have pain in your lower back or belly.  Get help right away if:  You have severe or uncontrolled pain.  You cannot urinate.  This information is not intended to replace advice given to you by your health care provider. Make sure you discuss any questions you have with your health care provider.

## 2019-09-09 NOTE — ED PROVIDER NOTE - PHYSICAL EXAMINATION
GENERAL: NAD, lying in bed comfortably  HEAD:  Atraumatic, Normocephalic  EYES: EOMI, PERRLA, conjunctiva and sclera clear  ENT: Moist mucous membranes  NECK: Supple, No JVD  CHEST/LUNG: Clear to auscultation bilaterally; No rales, rhonchi, wheezing, or rubs. Unlabored respirations  HEART: Regular rate and rhythm; No murmurs, rubs, or gallops  ABDOMEN: Bowel sounds present; Soft, Nontender, Nondistended. No hepatomegaly  EXTREMITIES:  2+ Peripheral Pulses, brisk capillary refill. No clubbing, cyanosis, or edema.  L toe joint tender on palpation w/ no swelling or warmth.   NERVOUS SYSTEM:  Alert & Oriented X3, speech clear. No deficits   MSK: FROM all 4 extremities, full and equal strength  SKIN: No rashes or lesions

## 2019-09-09 NOTE — ED PROVIDER NOTE - OBJECTIVE STATEMENT
59F hx of gout presents with gout to L 1st toe.  Tried taking ibuprofen 600 w/ no relief. Reports allergy to prednisone and colchicine (gives facial swelling, headache and palpitations).

## 2020-05-21 ENCOUNTER — APPOINTMENT (OUTPATIENT)
Dept: SPINE | Facility: CLINIC | Age: 60
End: 2020-05-21

## 2020-06-01 ENCOUNTER — APPOINTMENT (OUTPATIENT)
Dept: SPINE | Facility: CLINIC | Age: 60
End: 2020-06-01
Payer: MEDICAID

## 2020-06-01 VITALS
HEART RATE: 77 BPM | WEIGHT: 149 LBS | BODY MASS INDEX: 26.4 KG/M2 | TEMPERATURE: 98.4 F | HEIGHT: 63 IN | OXYGEN SATURATION: 98 % | SYSTOLIC BLOOD PRESSURE: 156 MMHG | DIASTOLIC BLOOD PRESSURE: 82 MMHG | RESPIRATION RATE: 12 BRPM

## 2020-06-01 PROCEDURE — 99212 OFFICE O/P EST SF 10 MIN: CPT

## 2020-06-01 NOTE — ASSESSMENT
[FreeTextEntry1] : 60 year old female with no changes in morphology of left sided meningioma.  Asymptomatic.  Once year follow up in 2021 for repeat brain MRI w/wo.   She will report any changes in her condition  over the next year.  .

## 2020-06-01 NOTE — REASON FOR VISIT
[Follow-Up: _____] : a [unfilled] follow-up visit [Other: _____] : [unfilled] [FreeTextEntry1] : 60 year old female with a history of left sided meningioma, asymptomatic.  She has been followed by serial MRI of the brain scans. Today the follow up  MR shows no changes and  stable appearance of the meningioma according to the official report. I too to have reviewed the images do not see any appreciable change in size.  She continues to remain asymptomatic.

## 2020-11-06 ENCOUNTER — RESULT REVIEW (OUTPATIENT)
Age: 60
End: 2020-11-06

## 2021-02-23 ENCOUNTER — NON-APPOINTMENT (OUTPATIENT)
Age: 61
End: 2021-02-23

## 2021-04-14 ENCOUNTER — APPOINTMENT (OUTPATIENT)
Dept: DISASTER EMERGENCY | Facility: OTHER | Age: 61
End: 2021-04-14
Payer: MEDICAID

## 2021-04-14 PROCEDURE — 0012A: CPT

## 2021-05-10 ENCOUNTER — APPOINTMENT (OUTPATIENT)
Dept: SPINE | Facility: CLINIC | Age: 61
End: 2021-05-10
Payer: MEDICAID

## 2021-05-10 VITALS
OXYGEN SATURATION: 93 % | DIASTOLIC BLOOD PRESSURE: 76 MMHG | WEIGHT: 150 LBS | SYSTOLIC BLOOD PRESSURE: 121 MMHG | HEART RATE: 77 BPM | HEIGHT: 63 IN | TEMPERATURE: 97.9 F | BODY MASS INDEX: 26.58 KG/M2

## 2021-05-10 PROCEDURE — 99072 ADDL SUPL MATRL&STAF TM PHE: CPT

## 2021-05-10 PROCEDURE — 99212 OFFICE O/P EST SF 10 MIN: CPT

## 2021-05-11 NOTE — PHYSICAL EXAM
[General Appearance - Alert] : alert [General Appearance - In No Acute Distress] : in no acute distress [FreeTextEntry5] : awake alert and oriented [FreeTextEntry6] : no pronator drift [] : no respiratory distress [Abnormal Walk] : normal gait

## 2021-05-11 NOTE — ASSESSMENT
[FreeTextEntry1] : 61-year-old woman presenting with stable left convexity meningioma.\par Normal neurological exam. Her MRI in one year

## 2021-05-11 NOTE — END OF VISIT
[FreeTextEntry3] : I, Dr. Nathanael Lee, evaluated the patient with the nurse practitioner Dom Disla and established the plan of care. I personally discuss this patient with the nurse practitioner at the time of the visit. I agree with the assessment and plan as written, unless noted below.\par \par

## 2021-05-11 NOTE — REASON FOR VISIT
[Follow-Up: _____] : a [unfilled] follow-up visit [FreeTextEntry1] : This patient's left convexity meningioma has been followed now for 9 or 10 years. She remained asymptomatic. A recent MRI scan shows no change in size of meningioma and no evidence of edema.

## 2021-07-16 NOTE — ED ADULT NURSE NOTE - CARDIO WDL
Please provide SDA on Monday. If worsening over the weekend, should go seen in the IC or ED.  Also, recommend bland diet due to report of colitis on CT and may want to start with clear liquids before advancing to solids pending improvement in her abdominal Normal rate, regular rhythm, normal S1, S2 heart sounds heard.

## 2021-07-21 ENCOUNTER — EMERGENCY (EMERGENCY)
Facility: HOSPITAL | Age: 61
LOS: 1 days | Discharge: HOME | End: 2021-07-21
Attending: EMERGENCY MEDICINE
Payer: MEDICAID

## 2021-07-21 VITALS
RESPIRATION RATE: 20 BRPM | TEMPERATURE: 98 F | HEART RATE: 87 BPM | OXYGEN SATURATION: 96 % | WEIGHT: 149.91 LBS | HEIGHT: 63 IN | SYSTOLIC BLOOD PRESSURE: 161 MMHG | DIASTOLIC BLOOD PRESSURE: 82 MMHG

## 2021-07-21 VITALS
SYSTOLIC BLOOD PRESSURE: 129 MMHG | RESPIRATION RATE: 18 BRPM | OXYGEN SATURATION: 98 % | HEART RATE: 70 BPM | TEMPERATURE: 98 F | DIASTOLIC BLOOD PRESSURE: 77 MMHG

## 2021-07-21 DIAGNOSIS — Z98.51 TUBAL LIGATION STATUS: Chronic | ICD-10-CM

## 2021-07-21 PROCEDURE — 73620 X-RAY EXAM OF FOOT: CPT | Mod: 26,RT

## 2021-07-21 PROCEDURE — 73610 X-RAY EXAM OF ANKLE: CPT | Mod: 26,RT

## 2021-07-21 PROCEDURE — 99283 EMERGENCY DEPT VISIT LOW MDM: CPT | Mod: 25

## 2021-07-21 PROCEDURE — 99283 EMERGENCY DEPT VISIT LOW MDM: CPT

## 2021-07-21 PROCEDURE — 73610 X-RAY EXAM OF ANKLE: CPT

## 2021-07-21 PROCEDURE — 73620 X-RAY EXAM OF FOOT: CPT

## 2021-07-21 RX ORDER — ACETAMINOPHEN 500 MG
975 TABLET ORAL ONCE
Refills: 0 | Status: COMPLETED | OUTPATIENT
Start: 2021-07-21 | End: 2021-07-21

## 2021-07-21 RX ADMIN — Medication 975 MILLIGRAM(S): at 05:41

## 2021-07-21 NOTE — ED PROVIDER NOTE - OBJECTIVE STATEMENT
Hx gout. Dropped wooden plank on R dorsal aspect of foot 1 week ago. Non ambulatory since then 2/2 pain . Notes edema, numbness, pain. Afraid it was gout attack "I'm prone to gout." Hx gout. Dropped wooden plank on R dorsal aspect of foot 1 week ago. Non ambulatory since then 2/2 pain . Notes edema, numbness, pain. Afraid it was gout attack "I'm prone to gout." Denies fevers.

## 2021-07-21 NOTE — ED PROVIDER NOTE - PROGRESS NOTE DETAILS
Iraida Disla PGY-2: XR negative for fx. Will DC with return precautions if redness or pain increaseses.

## 2021-07-21 NOTE — ED ADULT NURSE REASSESSMENT NOTE - NS ED NURSE REASSESS COMMENT FT1
Report received from Rakel MILLS at 0700. Pt present to ED c/o foot injury. Pt not assessed by oncoming dayshift RN. Pt discharged by Nighat TREVINO at change of shift. Pt awake and alert. Pt brought to discharge area in wheelchair.

## 2021-07-21 NOTE — ED ADULT TRIAGE NOTE - CHIEF COMPLAINT QUOTE
right foot pain/injury s/p dropping piece of ply wood on foot x1 week ago. Pt endorses taking allopurinol for pain, states, "im very prone to gout, im scared this is gout." Denies numbness/tingling.

## 2021-07-21 NOTE — ED PROVIDER NOTE - CLINICAL SUMMARY MEDICAL DECISION MAKING FREE TEXT BOX
RLE pain and swelling 2/2 trauma to foot one week ago. No fevers. Mild erythema on dorsal aspect. Low suspicion for cellulitis or gout flare in absence of fevers. DDx fracture vs hematoma. Will get XR, treat pain. Likely DC home with NSAIDS.

## 2021-07-21 NOTE — ED PROVIDER NOTE - NS ED ROS FT
CONST: no fevers, no chills  EYES: no pain, no vision changes  ENT: no sore throat, no ear pain, no change in hearing  CV: no chest pain, no leg swelling  RESP: no shortness of breath, no cough  ABD: no abdominal pain, no nausea, no vomiting, no diarrhea  : no dysuria, no flank pain, no hematuria  MSK: no back pain, +RLE pain and swelling  NEURO: no headache or additional neurologic complaints  HEME: no easy bleeding  SKIN:  no rash

## 2021-07-21 NOTE — ED ADULT NURSE NOTE - OBJECTIVE STATEMENT
62 y/o female PMHx Gout, meningioma arrives to [facility] ED by car from home with c/o foot pain/injury. Pt states dropped plank of wood on left foot x 1 week ago, ambulatory after incident, Sunday unable to ambulate due to pain in left foot, began taking allopurinol due to history of gout, symptoms have no improved. Patient with 10/10 pain with ambulation, redness and swelling to left dorsal and ventral foot, and left ankle. Patient states with gout sympotms has pain in 1st toe, but denies pain in toes at this time. Patient is A&Ox4. Respirations spontaneous and unlabored. Denies SOB, dyspnea, cough, chest pain, palpitations, abdominal pain, n/v/d, urinary symptoms, fever/chills, sick contacts. Skin is warm/dry and normal for race, except redness/swelling left lower extremity. Ambulates independently at baseline. 62 y/o female PMHx Gout, meningioma arrives to [facility] ED by car from home with c/o foot pain/injury. Pt states dropped plank of wood on right foot x 1 week ago, ambulatory after incident, Sunday unable to ambulate due to pain in right foot, began taking allopurinol due to history of gout, symptoms have no improved. Patient with 10/10 pain with ambulation, redness and swelling to right dorsal and ventral foot, and right ankle. Patient states with gout symptoms has pain in 1st toe, but denies pain in toes at this time. Patient is A&Ox4. Respirations spontaneous and unlabored. Denies SOB, dyspnea, cough, chest pain, palpitations, abdominal pain, n/v/d, urinary symptoms, fever/chills, sick contacts. Skin is warm/dry and normal for race, except redness/swelling right lower extremity. Ambulates independently at baseline.

## 2021-07-21 NOTE — ED PROVIDER NOTE - NSFOLLOWUPINSTRUCTIONS_ED_ALL_ED_FT
You were evaluated in the emergency department for foot pain. The xrays showed no fracture.    You can take ibuprofen 3 times a day for pain and swelling. Monitor the redness on your foot. If it gets worse and you spike a fever, please come back to the emergency department.    Please return to the emergency department with any new or worsening symptoms, including:   -High fevers  -Worsening pain not controlled with medications  -Worsening redness on the foot  -Numbness or tingling of the foot  -Loss of feeling in the foot

## 2021-07-21 NOTE — ED PROVIDER NOTE - PHYSICAL EXAMINATION
GENERAL: Awake, alert, NAD  HEENT: NC/AT, moist mucous membranes, PERRL, EOMI  LUNGS: CTAB, no wheezes or crackles   CARDIAC: RRR, no m/r/g  ABDOMEN: Soft, normal BS, non tender, non distended, no rebound, no guarding  BACK: No midline spinal tenderness, no CVA tenderness  EXT: No edema, no calf tenderness, 2+ DP pulses bilaterally, no deformities. RLE - diffusely edematous across dorsum w/ mild erythema, metatarsals 3-5 TTP, lateral malleolus TTP, sensation intact, distal pulses intact, reduced ROM 2/2 pain.   NEURO: A&Ox3. Moving all extremities.  SKIN: Warm and dry. No rash.  PSYCH: Normal affect.

## 2021-07-21 NOTE — ED PROVIDER NOTE - ATTENDING CONTRIBUTION TO CARE
Pt with pain to dorsum of R foot since injuring it 1 week ago, pt concerned for gout. on exam, pt with some edema over dorsum of foot and tenderness, mild erythema, no significant warmth. xray without acute fracture. while not completely clinically consistent with gout, due to trauma and hx could be mild gout flare vs contusion from trauma. will suggest scheduling NSAIDs for 1 week which should help with either possibility. low concern for cellulitis at this time but pt received return precautions of redness gets worse or she develops systemic symptoms.

## 2021-07-21 NOTE — ED PROVIDER NOTE - PATIENT PORTAL LINK FT
You can access the FollowMyHealth Patient Portal offered by [facility] by registering at the following website: [email or web address]. By joining [facility]’s FollowMyHealth portal, you will also be able to view your health information using other applications (apps) compatible with our system.

## 2022-09-12 ENCOUNTER — APPOINTMENT (OUTPATIENT)
Dept: SPINE | Facility: CLINIC | Age: 62
End: 2022-09-12

## 2022-09-12 VITALS
HEART RATE: 59 BPM | WEIGHT: 150 LBS | BODY MASS INDEX: 26.58 KG/M2 | OXYGEN SATURATION: 98 % | DIASTOLIC BLOOD PRESSURE: 78 MMHG | HEIGHT: 63 IN | SYSTOLIC BLOOD PRESSURE: 120 MMHG

## 2022-09-12 DIAGNOSIS — D32.9 BENIGN NEOPLASM OF MENINGES, UNSPECIFIED: ICD-10-CM

## 2022-09-12 PROCEDURE — 99213 OFFICE O/P EST LOW 20 MIN: CPT

## 2022-09-13 NOTE — REASON FOR VISIT
[Follow-Up: _____] : a [unfilled] follow-up visit [Other: _____] : [unfilled] [FreeTextEntry1] : Ms. Shin is here for a follow up. She has a left convexity meningioma has been followed since 2016. She remains asymptomatic. A recent MRI scan shows no change in size of meningioma.\par  \par

## 2022-09-21 ENCOUNTER — NON-APPOINTMENT (OUTPATIENT)
Age: 62
End: 2022-09-21

## 2022-09-21 ENCOUNTER — APPOINTMENT (OUTPATIENT)
Dept: ORTHOPEDIC SURGERY | Facility: CLINIC | Age: 62
End: 2022-09-21

## 2022-09-21 VITALS
SYSTOLIC BLOOD PRESSURE: 137 MMHG | BODY MASS INDEX: 26.05 KG/M2 | HEART RATE: 64 BPM | HEIGHT: 63 IN | WEIGHT: 147 LBS | DIASTOLIC BLOOD PRESSURE: 77 MMHG

## 2022-09-21 DIAGNOSIS — L65.9 NONSCARRING HAIR LOSS, UNSPECIFIED: ICD-10-CM

## 2022-09-21 DIAGNOSIS — M79.646 PAIN IN UNSPECIFIED FINGER(S): ICD-10-CM

## 2022-09-21 PROCEDURE — 20604 DRAIN/INJ JOINT/BURSA W/US: CPT | Mod: F8

## 2022-09-21 PROCEDURE — 99204 OFFICE O/P NEW MOD 45 MIN: CPT | Mod: 25

## 2022-09-21 PROCEDURE — 73140 X-RAY EXAM OF FINGER(S): CPT | Mod: F8

## 2022-09-21 NOTE — PHYSICAL EXAM
[de-identified] : - Constitutional: This is a female in no obvious distress.  \par - Psych: Patient is alert and oriented to person, place and time.  Patient has a normal mood and affect.\par - Cardiovascular: Normal pulses throughout the upper extremities.  No significant varicosities are noted in the upper extremities. \par - Neuro: Strength and sensation are intact throughout the upper extremities.  Patient has normal coordination.\par - Respiratory:  Patient exhibits no evidence of shortness of breath or difficulty breathing.\par - Skin: No rashes, lesions, or other abnormalities are noted in the upper extremities.\par \par --- \par \par Examination of her right ring finger demonstrates a freely mobile mass along the ulnar aspect of the PIP joint.  There is mild tenderness.  She has full flexion and extension.  She is neurovascularly intact distally. [de-identified] : AP, lateral, and oblique radiographs of the right ring finger demonstrate to moderate degenerative changes at the PIP joint greater than the DIP joint.  There is also evidence of arthritis of the middle finger DIP joint which was included in the x-ray.\par \par A diagnostic ultrasound of the right ring finger PIP joint was performed. This demonstrated a well-circumscribed mass.

## 2022-09-21 NOTE — HISTORY OF PRESENT ILLNESS
[Right] : right hand dominant [FreeTextEntry1] : She comes in today for evaluation of a right ring finger cyst which she developed 3 years ago. The cyst is located ulnarly at the PIP joint of the right ring finger. It has increased in size with time. She denies pain as well as numbness and tingling. She has not had this treated in the past.

## 2022-09-21 NOTE — END OF VISIT
[FreeTextEntry3] : This note was written by Deepa Dennis on 09/21/2022 acting solely as a scribe for Dr. Dell Pretty.\par  \par All medical record entries made by the Scribe were at my, Dr. Dell Pretty, direction and personally dictated by me on 09/21/2022. I have personally reviewed the chart and agree that the record accurately reflects my personal performance of the history, physical exam, assessment and plan.

## 2022-09-21 NOTE — ADDENDUM
[FreeTextEntry1] : I, Deepa Dennis, acted solely as a scribe for Dr. Pretty on this date on 09/21/2022.

## 2022-09-21 NOTE — DISCUSSION/SUMMARY
[FreeTextEntry1] : She has findings consistent with a benign-appearing right ring finger mass along the ulnar aspect of the PIP joint.  This either represents a fibroma, giant cell tumor of tendon sheath or other similar benign etiologies.\par \par I had a discussion with the patient regarding today's visit, the prognosis of this diagnosis, and treatment recommendations and options. At this time, she agreed to proceed with aspiration of the PIP joint aspirated. As the aspiration was unsuccessful, we discussed further treatment options of surgical excision. She stated that she is greatly bothered by the cyst and would like to proceed with operative management. She will speak with our surgical scheduler and be scheduled for the procedure some time in the near future. \par \par -  The nature and purposes of the operation/procedure was discussed in detail.  I discussed the surgical procedure in detail, as well as expected postoperative recovery and outcome.\par -  Possible risks, benefits, and complications (from known and unknown causes) of the procedure were discussed in detail.  \par -  Possible non-operative alternatives to the proposed treatment were discussed in detail.  \par -  She was told that possible risks/complications include, but are not limited to:  Infection, digital nerve or vessel injury, stiffness, painful scar, poor outcome, need for additional surgical procedures, and other unforeseen complications.  \par -  In addition, the possibility of an "unsuccessful outcome," despite "successful surgery," was discussed with her.  Specifically, we discussed the potential chance of recurrence.\par -  The patient fully understands these risks and wishes to proceed.  \par -  I had a lengthy discussion with the patient regarding today's visit, the diagnosis, and my surgical treatment recommendations.  The patient has agreed to this plan of management and has expressed full understanding.  All questions were fully answered to the patient's satisfaction. \par \par My cumulative time spent on this visit included: Preparation for the visit, review of the medical records, review of pertinent diagnostic studies, examination and counseling of the patient on the above diagnosis, treatment plan and prognosis, orders of diagnostic tests, medication and/or appropriate procedures and documentation in the medical records of today's visit.

## 2022-09-21 NOTE — PROCEDURE
[FreeTextEntry1] : Under sterile technique, an aspiration was attempted of the right ring finger PIP joint growth. No fluid was aspirated.

## 2022-10-03 ENCOUNTER — OUTPATIENT (OUTPATIENT)
Dept: OUTPATIENT SERVICES | Facility: HOSPITAL | Age: 62
LOS: 1 days | End: 2022-10-03
Payer: MEDICAID

## 2022-10-03 VITALS
WEIGHT: 139.99 LBS | SYSTOLIC BLOOD PRESSURE: 120 MMHG | TEMPERATURE: 98 F | HEIGHT: 63 IN | DIASTOLIC BLOOD PRESSURE: 70 MMHG | RESPIRATION RATE: 14 BRPM | HEART RATE: 60 BPM | OXYGEN SATURATION: 99 %

## 2022-10-03 DIAGNOSIS — N18.5 CHRONIC KIDNEY DISEASE, STAGE 5: Chronic | ICD-10-CM

## 2022-10-03 DIAGNOSIS — Z01.818 ENCOUNTER FOR OTHER PREPROCEDURAL EXAMINATION: ICD-10-CM

## 2022-10-03 DIAGNOSIS — Z98.51 TUBAL LIGATION STATUS: Chronic | ICD-10-CM

## 2022-10-03 DIAGNOSIS — R22.31 LOCALIZED SWELLING, MASS AND LUMP, RIGHT UPPER LIMB: ICD-10-CM

## 2022-10-03 DIAGNOSIS — D21.11 BENIGN NEOPLASM OF CONNECTIVE AND OTHER SOFT TISSUE OF RIGHT UPPER LIMB, INCLUDING SHOULDER: ICD-10-CM

## 2022-10-03 LAB
ANION GAP SERPL CALC-SCNC: 2 MMOL/L — LOW (ref 5–17)
BUN SERPL-MCNC: 17 MG/DL — SIGNIFICANT CHANGE UP (ref 7–23)
CALCIUM SERPL-MCNC: 9.1 MG/DL — SIGNIFICANT CHANGE UP (ref 8.4–10.5)
CHLORIDE SERPL-SCNC: 105 MMOL/L — SIGNIFICANT CHANGE UP (ref 96–108)
CO2 SERPL-SCNC: 32 MMOL/L — HIGH (ref 22–31)
CREAT SERPL-MCNC: 0.93 MG/DL — SIGNIFICANT CHANGE UP (ref 0.5–1.3)
EGFR: 69 ML/MIN/1.73M2 — SIGNIFICANT CHANGE UP
GLUCOSE SERPL-MCNC: 124 MG/DL — HIGH (ref 70–99)
HCT VFR BLD CALC: 41.4 % — SIGNIFICANT CHANGE UP (ref 34.5–45)
HGB BLD-MCNC: 13.5 G/DL — SIGNIFICANT CHANGE UP (ref 11.5–15.5)
MCHC RBC-ENTMCNC: 31 PG — SIGNIFICANT CHANGE UP (ref 27–34)
MCHC RBC-ENTMCNC: 32.6 GM/DL — SIGNIFICANT CHANGE UP (ref 32–36)
MCV RBC AUTO: 95.2 FL — SIGNIFICANT CHANGE UP (ref 80–100)
NRBC # BLD: 0 /100 WBCS — SIGNIFICANT CHANGE UP (ref 0–0)
PLATELET # BLD AUTO: 239 K/UL — SIGNIFICANT CHANGE UP (ref 150–400)
POTASSIUM SERPL-MCNC: 4.1 MMOL/L — SIGNIFICANT CHANGE UP (ref 3.5–5.3)
POTASSIUM SERPL-SCNC: 4.1 MMOL/L — SIGNIFICANT CHANGE UP (ref 3.5–5.3)
RBC # BLD: 4.35 M/UL — SIGNIFICANT CHANGE UP (ref 3.8–5.2)
RBC # FLD: 13 % — SIGNIFICANT CHANGE UP (ref 10.3–14.5)
SODIUM SERPL-SCNC: 139 MMOL/L — SIGNIFICANT CHANGE UP (ref 135–145)
WBC # BLD: 5.93 K/UL — SIGNIFICANT CHANGE UP (ref 3.8–10.5)
WBC # FLD AUTO: 5.93 K/UL — SIGNIFICANT CHANGE UP (ref 3.8–10.5)

## 2022-10-03 PROCEDURE — 85027 COMPLETE CBC AUTOMATED: CPT

## 2022-10-03 PROCEDURE — G0463: CPT

## 2022-10-03 PROCEDURE — 93010 ELECTROCARDIOGRAM REPORT: CPT

## 2022-10-03 PROCEDURE — 36415 COLL VENOUS BLD VENIPUNCTURE: CPT

## 2022-10-03 PROCEDURE — 80048 BASIC METABOLIC PNL TOTAL CA: CPT

## 2022-10-03 PROCEDURE — 93005 ELECTROCARDIOGRAM TRACING: CPT

## 2022-10-03 NOTE — H&P PST ADULT - HISTORY OF PRESENT ILLNESS
61 y/o female who presents with right ring finger mass that has been increasing in size for the past 4 years . Denies any pain ,numbness  . scheduled for excision of right ring finger mass on 10/13/22

## 2022-10-03 NOTE — H&P PST ADULT - NSICDXFAMILYHX_GEN_ALL_CORE_FT
FAMILY HISTORY:  Father  Still living? No  FH: pancreatic cancer, Age at diagnosis: Age Unknown    Sibling  Still living? Yes, Estimated age: Age Unknown  Family history of kidney diseases, Age at diagnosis: Age Unknown  Family history of stroke, Age at diagnosis: Age Unknown

## 2022-10-03 NOTE — H&P PST ADULT - MUSCULOSKELETAL COMMENTS
right ring mass at PIP joint right ring finger mass right ring mass at PIP joint, non tender on palpation

## 2022-10-03 NOTE — H&P PST ADULT - NSICDXPASTMEDICALHX_GEN_ALL_CORE_FT
PAST MEDICAL HISTORY:  Finger mass, right ring finger    Gout     Meningioma Dx 2017 under care of Nathanael Ulloairologmaria teresa  with yrly MRI . last MRI 9/2022

## 2022-10-03 NOTE — H&P PST ADULT - PROBLEM SELECTOR PLAN 1
scheduled for excision of right ring finger mass on 10/13./22  Will obtain medical clearance   pre op instruction s   COVID test on 10/11/22 at 10;30 am scheduled for excision of right ring finger mass on 10/13./22  Will obtain medical clearance   Neuro note Dr Lee . Last MRI report   pre op instruction    COVID test on 10/11/22 at 10;30 am

## 2022-10-04 ENCOUNTER — NON-APPOINTMENT (OUTPATIENT)
Age: 62
End: 2022-10-04

## 2022-10-05 RX ORDER — CEFAZOLIN SODIUM 1 G
2000 VIAL (EA) INJECTION ONCE
Refills: 0 | Status: DISCONTINUED | OUTPATIENT
Start: 2022-10-13 | End: 2022-10-27

## 2022-10-06 ENCOUNTER — NON-APPOINTMENT (OUTPATIENT)
Age: 62
End: 2022-10-06

## 2022-10-11 ENCOUNTER — OUTPATIENT (OUTPATIENT)
Dept: OUTPATIENT SERVICES | Facility: HOSPITAL | Age: 62
LOS: 1 days | End: 2022-10-11
Payer: MEDICAID

## 2022-10-11 DIAGNOSIS — Z01.818 ENCOUNTER FOR OTHER PREPROCEDURAL EXAMINATION: ICD-10-CM

## 2022-10-11 DIAGNOSIS — Z98.51 TUBAL LIGATION STATUS: Chronic | ICD-10-CM

## 2022-10-11 LAB — SARS-COV-2 RNA SPEC QL NAA+PROBE: SIGNIFICANT CHANGE UP

## 2022-10-11 PROCEDURE — U0005: CPT

## 2022-10-11 PROCEDURE — U0003: CPT

## 2022-10-12 ENCOUNTER — TRANSCRIPTION ENCOUNTER (OUTPATIENT)
Age: 62
End: 2022-10-12

## 2022-10-13 ENCOUNTER — OUTPATIENT (OUTPATIENT)
Dept: OUTPATIENT SERVICES | Facility: HOSPITAL | Age: 62
LOS: 1 days | End: 2022-10-13
Payer: MEDICAID

## 2022-10-13 ENCOUNTER — APPOINTMENT (OUTPATIENT)
Dept: ORTHOPEDIC SURGERY | Facility: HOSPITAL | Age: 62
End: 2022-10-13

## 2022-10-13 ENCOUNTER — TRANSCRIPTION ENCOUNTER (OUTPATIENT)
Age: 62
End: 2022-10-13

## 2022-10-13 ENCOUNTER — RESULT REVIEW (OUTPATIENT)
Age: 62
End: 2022-10-13

## 2022-10-13 VITALS
TEMPERATURE: 98 F | DIASTOLIC BLOOD PRESSURE: 69 MMHG | SYSTOLIC BLOOD PRESSURE: 127 MMHG | HEIGHT: 63 IN | WEIGHT: 136.91 LBS | RESPIRATION RATE: 16 BRPM | OXYGEN SATURATION: 99 % | HEART RATE: 64 BPM

## 2022-10-13 VITALS
HEART RATE: 77 BPM | OXYGEN SATURATION: 100 % | SYSTOLIC BLOOD PRESSURE: 138 MMHG | DIASTOLIC BLOOD PRESSURE: 78 MMHG | RESPIRATION RATE: 18 BRPM

## 2022-10-13 DIAGNOSIS — Z98.51 TUBAL LIGATION STATUS: Chronic | ICD-10-CM

## 2022-10-13 DIAGNOSIS — D21.11 BENIGN NEOPLASM OF CONNECTIVE AND OTHER SOFT TISSUE OF RIGHT UPPER LIMB, INCLUDING SHOULDER: ICD-10-CM

## 2022-10-13 PROBLEM — D32.9 BENIGN NEOPLASM OF MENINGES, UNSPECIFIED: Chronic | Status: ACTIVE | Noted: 2019-07-21

## 2022-10-13 PROBLEM — R22.31 LOCALIZED SWELLING, MASS AND LUMP, RIGHT UPPER LIMB: Chronic | Status: ACTIVE | Noted: 2022-10-03

## 2022-10-13 PROCEDURE — 26111 EXC HAND LES SC 1.5 CM/>: CPT | Mod: F8

## 2022-10-13 PROCEDURE — 88305 TISSUE EXAM BY PATHOLOGIST: CPT

## 2022-10-13 PROCEDURE — 88305 TISSUE EXAM BY PATHOLOGIST: CPT | Mod: 26

## 2022-10-13 PROCEDURE — 26113 EXC HAND TUM DEEP 1.5 CM/>: CPT | Mod: F8

## 2022-10-13 RX ORDER — OXYCODONE HYDROCHLORIDE 5 MG/1
5 TABLET ORAL ONCE
Refills: 0 | Status: DISCONTINUED | OUTPATIENT
Start: 2022-10-13 | End: 2022-10-13

## 2022-10-13 RX ORDER — CHLORHEXIDINE GLUCONATE 213 G/1000ML
1 SOLUTION TOPICAL ONCE
Refills: 0 | Status: COMPLETED | OUTPATIENT
Start: 2022-10-13 | End: 2022-10-13

## 2022-10-13 RX ORDER — ONDANSETRON 8 MG/1
4 TABLET, FILM COATED ORAL ONCE
Refills: 0 | Status: DISCONTINUED | OUTPATIENT
Start: 2022-10-13 | End: 2022-10-13

## 2022-10-13 RX ORDER — IBUPROFEN 200 MG
1 TABLET ORAL
Qty: 10 | Refills: 0
Start: 2022-10-13

## 2022-10-13 RX ORDER — HYDROMORPHONE HYDROCHLORIDE 2 MG/ML
0.5 INJECTION INTRAMUSCULAR; INTRAVENOUS; SUBCUTANEOUS
Refills: 0 | Status: DISCONTINUED | OUTPATIENT
Start: 2022-10-13 | End: 2022-10-13

## 2022-10-13 RX ORDER — HYDROMORPHONE HYDROCHLORIDE 2 MG/ML
1 INJECTION INTRAMUSCULAR; INTRAVENOUS; SUBCUTANEOUS
Refills: 0 | Status: DISCONTINUED | OUTPATIENT
Start: 2022-10-13 | End: 2022-10-13

## 2022-10-13 RX ORDER — SODIUM CHLORIDE 9 MG/ML
1000 INJECTION, SOLUTION INTRAVENOUS
Refills: 0 | Status: DISCONTINUED | OUTPATIENT
Start: 2022-10-13 | End: 2022-10-13

## 2022-10-13 RX ADMIN — CHLORHEXIDINE GLUCONATE 1 APPLICATION(S): 213 SOLUTION TOPICAL at 06:53

## 2022-10-13 NOTE — ASU DISCHARGE PLAN (ADULT/PEDIATRIC) - BRAND OF COVID-19 VACCINATION
ÁNGEL PACU-to-NURSING HANDOFF    Procedure:  Procedure(s):  DILATION AND CURETTAGE SUCTION  - Wound Class: II-Clean Contaminated   {NON-OPERATIVE PROCEDURES:601217  Last Vitals: Blood pressure 115/65, pulse 115, temperature 97.7  F (36.5  C), temperature source Temporal, resp. rate 19, last menstrual period 03/19/2017, SpO2 100 %, not currently breastfeeding.   Reason patient is transferring to unit: requires extended recovery for monitor Hgb  Can discharge home once goals are met: Yes.   Prescriptions: No new prescriptions written    Pain Management:: No pain medication given, besides toradol @ 0045  Pain Control: Good     Incision Site: Clean, dry, and intact  Antiemetic Management: none  IV Fluid total: 800 mL  Elimination Status: straight cath in OR for 30 ml      Discharge Teaching: Not applicable  Patient has a Ride Home:  Yes.   Patient has someone to stay with them 24 hours after the procedure:  Yes.     PACU Nurse Name/Phone Number: Gene Carrero,   1:07 AM    Above PACU Nurse Handoff Report was reviewed: No  Reviewed by:   Moderna dose 1 and 2

## 2022-10-13 NOTE — ASU DISCHARGE PLAN (ADULT/PEDIATRIC) - ASU DC SPECIAL INSTRUCTIONSFT
Keep bandage clean, dry , & intact daily.  May remove bandage on Monday, 10/17/22  Call the Dr.  for fever, severe pain.  Call for an appointment for office visit on Friday, 10/21/22.   May take Extra Strength Tylenol as needed for pain.

## 2022-10-13 NOTE — BRIEF OPERATIVE NOTE - NSICDXBRIEFPROCEDURE_GEN_ALL_CORE_FT
PROCEDURES:  Excision of mass of joint of finger 13-Oct-2022 08:17:59 right ring finger Deidre Mitchell

## 2022-10-13 NOTE — ASU PATIENT PROFILE, ADULT - FALL HARM RISK - PT AGE POPULATION HIDDEN
Lisdexamfetamine Dimesylate is the generic for Vvyanse which was sent over to their pharmacy.  Adderall XR 30 mg was making patient irritable as we discussed this morning trying a different medication as Mom has noted improvement in the irritability being o Adult

## 2022-10-13 NOTE — BRIEF OPERATIVE NOTE - NSICDXBRIEFPOSTOP_GEN_ALL_CORE_FT
POST-OP DIAGNOSIS:  Benign connective tissue neoplasm of finger, right 13-Oct-2022 08:18:58 right ring finger Deidre Mitchell

## 2022-10-13 NOTE — BRIEF OPERATIVE NOTE - NSICDXBRIEFPREOP_GEN_ALL_CORE_FT
PRE-OP DIAGNOSIS:  Benign connective tissue neoplasm of finger, right 13-Oct-2022 08:18:34 right ring finger Deidre Mitchell

## 2022-10-13 NOTE — ASU DISCHARGE PLAN (ADULT/PEDIATRIC) - CARE PROVIDER_API CALL
Dell Pretty)  Orthopaedic Surgery; Surgery of the Hand  [address], [facility] [address]  [place] [address]  Phone: [phone]  Fax: [phone]  Follow Up Time: 1 week

## 2022-10-13 NOTE — ASU PATIENT PROFILE, ADULT - TEACHING/LEARNING OTHER LEARNERS
Try mucinex DM for cough control during the day    Phenergan with codeine is a sedating medication. Do not drink alcohol, drive a car, or operate heavy machinery while taking this medication.
family

## 2022-10-14 PROBLEM — D21.11 BENIGN NEOPLASM OF SOFT TISSUES OF UPPER LIMB, RIGHT: Status: ACTIVE | Noted: 2022-09-21

## 2022-10-21 ENCOUNTER — APPOINTMENT (OUTPATIENT)
Dept: ORTHOPEDIC SURGERY | Facility: CLINIC | Age: 62
End: 2022-10-21

## 2022-10-21 DIAGNOSIS — D21.11 BENIGN NEOPLASM OF CONNECTIVE AND OTHER SOFT TISSUE OF RIGHT UPPER LIMB, INCLUDING SHOULDER: ICD-10-CM

## 2022-10-21 PROCEDURE — 99024 POSTOP FOLLOW-UP VISIT: CPT

## 2022-10-21 NOTE — HISTORY OF PRESENT ILLNESS
[de-identified] : 8 days postoperative. [de-identified] : 8 days status post excision of right ring finger mass.  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 She denies numbness and tingling. She is however concerned of slight redness just proximally to the incision site.  [de-identified] : Examination of her right ring finger after the dressing was removed demonstrates her incision to be clean and dry.  There is no drainage or evidence of an infection.  She has full flexion and extension.  She is neurovascularly intact distally. [de-identified] : Stable, 8 days postoperative. [de-identified] : The sutures were removed and Steri-Strips were applied.  She was instructed on local wound care, when to begin scar massage and desensitization, range of motion exercises and will followup in 3 weeks if needed, according to her symptoms.  Finally, we did discuss the potential chance of recurrence.

## 2022-10-21 NOTE — END OF VISIT
[FreeTextEntry3] : This note was written by Deepa Dennis on 10/21/2022 acting solely as a scribe for Dr. Dell Pretty.\par  \par All medical record entries made by the Scribe were at my, Dr. Dell Pretty, direction and personally dictated by me on 10/21/2022. I have personally reviewed the chart and agree that the record accurately reflects my personal performance of the history, physical exam, assessment and plan.

## 2022-10-21 NOTE — ADDENDUM
[FreeTextEntry1] : I, Deepa Dennis, acted solely as a scribe for Dr. Pretty on this date on 10/21/2022.

## 2023-04-13 ENCOUNTER — APPOINTMENT (OUTPATIENT)
Dept: ORTHOPEDIC SURGERY | Facility: CLINIC | Age: 63
End: 2023-04-13

## 2023-08-02 NOTE — ED PROVIDER NOTE - NSFOLLOWUPINSTRUCTIONS_ED_ALL_ED_FT
You were seen in the emergency department for headache. Please follow up with your primary doctor and your neurosurgeon. Take motrin 400mg every 4 hours for continued headache and gout flare, but make sure to eat when taking this medication. Return to the emergency department immediately if you experience changes in vision, fever or any other concerning symptoms. Topical Sulfur Applications Pregnancy And Lactation Text: This medication is Pregnancy Category C and has an unknown safety profile during pregnancy. It is unknown if this topical medication is excreted in breast milk.

## 2023-09-18 ENCOUNTER — APPOINTMENT (OUTPATIENT)
Dept: SPINE | Facility: CLINIC | Age: 63
End: 2023-09-18

## 2024-02-08 ENCOUNTER — APPOINTMENT (OUTPATIENT)
Dept: ORTHOPEDIC SURGERY | Facility: CLINIC | Age: 64
End: 2024-02-08
Payer: MEDICAID

## 2024-02-08 PROCEDURE — 99204 OFFICE O/P NEW MOD 45 MIN: CPT | Mod: 25

## 2024-02-08 PROCEDURE — 73130 X-RAY EXAM OF HAND: CPT | Mod: 50

## 2024-02-08 PROCEDURE — 20526 THER INJECTION CARP TUNNEL: CPT | Mod: LT

## 2024-02-08 NOTE — HISTORY OF PRESENT ILLNESS
[8] : 8 [Dull/Aching] : dull/aching [Throbbing] : throbbing [Constant] : constant [Leisure] : leisure [de-identified] : 2/8/24: 64yo RHD female (not working) is here for bilateral hand pain and numbness/tingling x 6 months. Symptoms are intermittent, worse at night. Denies injury. Reports that she saw a Neurologist, on Dr. Lauren Patel (T: [phone], F: [phone]) on 2/6/24 and had an EMG done, but does not have the report. Wearing wrist night splint x 2 months, which has helped a little. Not currently taking any medication for this.  Hx: Meningioma. Sx: excision of RIGHT ring finger GCTTS (Dr. Dell Pretty - 10/13/22). [] : no [FreeTextEntry5] : EFRAIN serna [RHD] 63 year old female is here today c/o BLACK hand pain. Gerardo trauma. Pain started 5 months ago. Feels n/t. Has cracking/ clicking. States she seen her pcp jan 24 and was given a nerve test. Stated she had carpel tunnel.

## 2024-02-08 NOTE — ASSESSMENT
[FreeTextEntry1] : The condition was explained to the patient.  Patient requesting CSI for CTS. Patient understands that while CSI may provide temporary relief of symptoms, it will not decompress the carpal tunnel as surgery would, and neuropathy may progress. - Discussed risks, benefits, and alternatives as well as contents of injection. Risks include, but are not limited allergic reaction, flare reaction, injection site pain, bruising, numbness, increased blood sugar, skin discoloration, fat atrophy, tendon rupture, and infection. Risk of immune suppression and increased susceptibility to infection with steroid use. Patient expressed understanding and would like to proceed with injection. - The skin over the LEFT volar wrist was cleansed with alcohol and anesthetized with ethyl chloride. The carpal tunnel was injected with 6mg of celestone, 2cc of 1% lidocaine. Site was dressed with a band-aid. Patient tolerated the procedure well.  F/u 2wks. Patient requested EDX be faxed to our [facility] office.

## 2024-02-08 NOTE — IMAGING
[de-identified] : LEFT HAND skin intact. min swelling of dorsal wrist. no TTP. wrist ROM: good extension, flexion. good EPL, FPL. good finger extension, flex to full fist. good finger abduction and adduction.  numbness of all digits. SILT dorsal 1st webspace, dorsal ulnar hand. palpable radial pulse, brisk cap refill all digits.  5/5, 1st DI 5/5, APB 4/5. no triggering. negative Tinel's at carpal tunnel. negative Phalen's test.   RIGHT HAND skin intact. no swelling. no TTP. wrist ROM: good extension, flexion. good EPL, FPL. good finger extension, flex to full fist. good finger abduction and adduction.  SILT to median, ulnar, radial distributions. palpable radial pulse, brisk cap refill all digits.  5/5, 1st DI 5/5, APB 4/5. no triggering. negative Tinel's at carpal tunnel. negative Phalen's test.   XRAYS OF LEFT HAND: no acute displaced fracture or dislocation. djd of IPJ of all digits. mild djd of thumb CMCJ. cystic changes in proximal ulnar lunate. XRAYS OF RIGHT HAND: no acute displaced fracture or dislocation. djd of IPJ of all digits. mild djd of thumb CMCJ.

## 2024-02-19 ENCOUNTER — APPOINTMENT (OUTPATIENT)
Dept: ORTHOPEDIC SURGERY | Facility: CLINIC | Age: 64
End: 2024-02-19
Payer: MEDICAID

## 2024-02-19 VITALS — HEIGHT: 63 IN | WEIGHT: 147 LBS | BODY MASS INDEX: 26.05 KG/M2

## 2024-02-19 VITALS — WEIGHT: 147 LBS | BODY MASS INDEX: 26.05 KG/M2 | HEIGHT: 63 IN

## 2024-02-19 DIAGNOSIS — G56.02 CARPAL TUNNEL SYNDROME, LEFT UPPER LIMB: ICD-10-CM

## 2024-02-19 DIAGNOSIS — Z78.9 OTHER SPECIFIED HEALTH STATUS: ICD-10-CM

## 2024-02-19 DIAGNOSIS — G56.01 CARPAL TUNNEL SYNDROME, RIGHT UPPER LIMB: ICD-10-CM

## 2024-02-19 PROCEDURE — 99214 OFFICE O/P EST MOD 30 MIN: CPT | Mod: 25

## 2024-02-19 PROCEDURE — 20526 THER INJECTION CARP TUNNEL: CPT | Mod: RT

## 2024-02-19 PROCEDURE — 99213 OFFICE O/P EST LOW 20 MIN: CPT | Mod: 25

## 2024-02-19 NOTE — IMAGING
[de-identified] : LEFT HAND skin intact. min swelling of dorsal wrist. no TTP. wrist ROM: good extension, flexion. good EPL, FPL. good finger extension, flex to full fist. good finger abduction and adduction.  numbness of all digits. SILT dorsal 1st webspace, dorsal ulnar hand. palpable radial pulse, brisk cap refill all digits.  5/5, 1st DI 5/5, APB 4+/5. no triggering. negative Tinel's at carpal tunnel. negative Phalen's test.   RIGHT HAND skin intact. no swelling. no TTP. wrist ROM: good extension, flexion. good EPL, FPL. good finger extension, flex to full fist. good finger abduction and adduction.  SILT to median, ulnar, radial distributions. palpable radial pulse, brisk cap refill all digits.  5/5, 1st DI 5/5, APB 4/5. no triggering. negative Tinel's at carpal tunnel. negative Phalen's test.

## 2024-02-19 NOTE — IMAGING
[de-identified] : LEFT HAND skin intact. min swelling of dorsal wrist. no TTP. wrist ROM: good extension, flexion. good EPL, FPL. good finger extension, flex to full fist. good finger abduction and adduction.  numbness of all digits. SILT dorsal 1st webspace, dorsal ulnar hand. palpable radial pulse, brisk cap refill all digits.  5/5, 1st DI 5/5, APB 4+/5. no triggering. negative Tinel's at carpal tunnel. negative Phalen's test.   RIGHT HAND skin intact. no swelling. no TTP. wrist ROM: good extension, flexion. good EPL, FPL. good finger extension, flex to full fist. good finger abduction and adduction.  SILT to median, ulnar, radial distributions. palpable radial pulse, brisk cap refill all digits.  5/5, 1st DI 5/5, APB 4/5. no triggering. negative Tinel's at carpal tunnel. negative Phalen's test.

## 2024-02-19 NOTE — REASON FOR VISIT
[FreeTextEntry2] : 02/19/2024 :EFRAIN PEREZ , a 63 year old female, presents today for bilateral had cts

## 2024-02-19 NOTE — HISTORY OF PRESENT ILLNESS
[de-identified] : 2/19/24: f/u EDX. f/u bilateral CTS - s/p CSI on the LEFT on 2/8/24. reports 100% relief of symptoms with injection.  EDX 2/6/24 - IMPRESSION: moderate bilateral CTS. - L median: DML 5.5 ms, DSL 3.8 ms. - R median: DML 7.0 ms, DSL 2.4 ms. - L ulnar: * m/s above to below elbow, 54 m/s below elbow to wrist. - R ulnar: * m/s above to below elbow, 51 m/s below elbow to wrist.  2/8/24: 62yo RHD female (not working) is here for bilateral hand pain and numbness/tingling x 6 months. Symptoms are intermittent, worse at night. Denies injury. Reports that she saw a Neurologist, on Dr. Lauren Patel (T: [phone], F: [phone]) on 2/6/24 and had an EMG done, but does not have the report. Wearing wrist night splint x 2 months, which has helped a little. Not currently taking any medication for this.  Hx: Meningioma. Sx: excision of RIGHT ring finger GCTTS (Dr. Dell Pretty - 10/13/22).

## 2024-02-19 NOTE — ASSESSMENT
[FreeTextEntry1] : EDX reviewed with patient. The condition was explained to the patient - bilateral CTS.  Patient requesting CSI for CTS. Patient understands that while CSI may provide temporary relief of symptoms, it will not decompress the carpal tunnel as surgery would, and neuropathy may progress. - Discussed risks, benefits, and alternatives as well as contents of injection. Risks include, but are not limited allergic reaction, flare reaction, injection site pain, bruising, numbness, increased blood sugar, skin discoloration, fat atrophy, tendon rupture, and infection. Risk of immune suppression and increased susceptibility to infection with steroid use. Patient expressed understanding and would like to proceed with injection. - The skin over the RIGHT volar wrist was cleansed with alcohol and anesthetized with ethyl chloride. The carpal tunnel was injected with 6mg of celestone, 2cc of 1% lidocaine. Site was dressed with a band-aid. Patient tolerated the procedure well.  F/u PRN.

## 2024-02-19 NOTE — HISTORY OF PRESENT ILLNESS
[de-identified] : 2/19/24: f/u EDX. f/u bilateral CTS - s/p CSI on the LEFT on 2/8/24. reports 100% relief of symptoms with injection.  EDX 2/6/24 - IMPRESSION: moderate bilateral CTS. - L median: DML 5.5 ms, DSL 3.8 ms. - R median: DML 7.0 ms, DSL 2.4 ms. - L ulnar: * m/s above to below elbow, 54 m/s below elbow to wrist. - R ulnar: * m/s above to below elbow, 51 m/s below elbow to wrist.  2/8/24: 62yo RHD female (not working) is here for bilateral hand pain and numbness/tingling x 6 months. Symptoms are intermittent, worse at night. Denies injury. Reports that she saw a Neurologist, on Dr. Lauren Patel (T: [phone], F: [phone]) on 2/6/24 and had an EMG done, but does not have the report. Wearing wrist night splint x 2 months, which has helped a little. Not currently taking any medication for this.  Hx: Meningioma. Sx: excision of RIGHT ring finger GCTTS (Dr. Dell Pretty - 10/13/22).

## 2024-06-04 ENCOUNTER — NON-APPOINTMENT (OUTPATIENT)
Age: 64
End: 2024-06-04

## 2024-06-04 ENCOUNTER — APPOINTMENT (OUTPATIENT)
Dept: CARDIOLOGY | Facility: CLINIC | Age: 64
End: 2024-06-04
Payer: MEDICAID

## 2024-06-04 VITALS
WEIGHT: 144 LBS | OXYGEN SATURATION: 97 % | BODY MASS INDEX: 25.52 KG/M2 | DIASTOLIC BLOOD PRESSURE: 80 MMHG | HEART RATE: 53 BPM | HEIGHT: 63 IN | SYSTOLIC BLOOD PRESSURE: 138 MMHG

## 2024-06-04 DIAGNOSIS — Z00.00 ENCOUNTER FOR GENERAL ADULT MEDICAL EXAMINATION W/OUT ABNORMAL FINDINGS: ICD-10-CM

## 2024-06-04 DIAGNOSIS — R94.31 ABNORMAL ELECTROCARDIOGRAM [ECG] [EKG]: ICD-10-CM

## 2024-06-04 DIAGNOSIS — R94.39 ABNORMAL RESULT OF OTHER CARDIOVASCULAR FUNCTION STUDY: ICD-10-CM

## 2024-06-04 PROCEDURE — 93000 ELECTROCARDIOGRAM COMPLETE: CPT

## 2024-06-04 PROCEDURE — G2211 COMPLEX E/M VISIT ADD ON: CPT | Mod: NC,1L

## 2024-06-04 PROCEDURE — 99204 OFFICE O/P NEW MOD 45 MIN: CPT | Mod: 25

## 2024-06-20 ENCOUNTER — APPOINTMENT (OUTPATIENT)
Dept: CARDIOLOGY | Facility: CLINIC | Age: 64
End: 2024-06-20
Payer: MEDICAID

## 2024-06-20 PROCEDURE — 93306 TTE W/DOPPLER COMPLETE: CPT

## 2024-09-11 NOTE — PHYSICAL EXAM
[Motor Strength] : muscle strength was normal in all four extremities [Sensation Tactile Decrease] : light touch was intact [Abnormal Walk] : normal gait [FreeTextEntry6] : no pronator drift done Chronic unstable as pt noncompliant with medications   Home regimen: aldactone 25mg daily, entresto 97-103mg BID, lasix 20mg daily, coreg 6.25mb BID  Cardiology consulted: start hydralazine   for Blood Pressure control for now. can start on low dose coreg if Blood Pressure tolerates. echo in am   strict i/o

## 2024-11-11 ENCOUNTER — APPOINTMENT (OUTPATIENT)
Dept: SPINE | Facility: CLINIC | Age: 64
End: 2024-11-11
Payer: MEDICAID

## 2024-11-11 ENCOUNTER — NON-APPOINTMENT (OUTPATIENT)
Age: 64
End: 2024-11-11

## 2024-11-11 VITALS
DIASTOLIC BLOOD PRESSURE: 87 MMHG | BODY MASS INDEX: 26.05 KG/M2 | HEIGHT: 63 IN | HEART RATE: 94 BPM | WEIGHT: 147 LBS | SYSTOLIC BLOOD PRESSURE: 134 MMHG | OXYGEN SATURATION: 96 %

## 2024-11-11 DIAGNOSIS — D32.9 BENIGN NEOPLASM OF MENINGES, UNSPECIFIED: ICD-10-CM

## 2024-11-11 PROCEDURE — 99213 OFFICE O/P EST LOW 20 MIN: CPT

## 2024-11-11 NOTE — HISTORY OF PRESENT ILLNESS
[FreeTextEntry1] : CARISA JEFFERSON is a 64 year old female with known left convexity meningioma has been followed since 2016. She remains asymptomatic. As of last visit in 2022 MRI scan showed no change in size of meningioma.     She is here for follow up today. She remains asymptomatic.

## 2024-11-11 NOTE — ASSESSMENT
[FreeTextEntry1] : 64 year old female with left convexity meningioma. She is asymptomatic and has a normal neurological exam.  PLAN: -MRI head WAW IV contrast

## 2024-11-11 NOTE — PHYSICAL EXAM
[General Appearance - Alert] : alert [General Appearance - In No Acute Distress] : in no acute distress [Oriented To Time, Place, And Person] : oriented to person, place, and time [Person] : oriented to person [Place] : oriented to place [Time] : oriented to time [Cranial Nerves Oculomotor (III)] : extraocular motion intact [Cranial Nerves Trigeminal (V)] : facial sensation intact symmetrically [Cranial Nerves Facial (VII)] : face symmetrical [Cranial Nerves Accessory (XI - Cranial And Spinal)] : head turning and shoulder shrug symmetric [Cranial Nerves Hypoglossal (XII)] : there was no tongue deviation with protrusion [Motor Strength] : muscle strength was normal in all four extremities [Sensation Tactile Decrease] : light touch was intact [Abnormal Walk] : normal gait [Balance] : balance was intact [FreeTextEntry6] : No pronator drift

## 2024-11-12 ENCOUNTER — APPOINTMENT (OUTPATIENT)
Dept: CARDIOLOGY | Facility: CLINIC | Age: 64
End: 2024-11-12
Payer: MEDICAID

## 2024-11-12 VITALS
SYSTOLIC BLOOD PRESSURE: 104 MMHG | WEIGHT: 142 LBS | HEIGHT: 63 IN | HEART RATE: 87 BPM | DIASTOLIC BLOOD PRESSURE: 68 MMHG | BODY MASS INDEX: 25.16 KG/M2 | OXYGEN SATURATION: 96 %

## 2024-11-12 DIAGNOSIS — R94.31 ABNORMAL ELECTROCARDIOGRAM [ECG] [EKG]: ICD-10-CM

## 2024-11-12 PROCEDURE — G2211 COMPLEX E/M VISIT ADD ON: CPT | Mod: NC

## 2024-11-12 PROCEDURE — 99214 OFFICE O/P EST MOD 30 MIN: CPT

## 2024-11-25 ENCOUNTER — APPOINTMENT (OUTPATIENT)
Dept: SPINE | Facility: CLINIC | Age: 64
End: 2024-11-25
Payer: MEDICAID

## 2024-11-25 VITALS
WEIGHT: 142 LBS | OXYGEN SATURATION: 98 % | HEART RATE: 86 BPM | BODY MASS INDEX: 25.16 KG/M2 | HEIGHT: 63 IN | DIASTOLIC BLOOD PRESSURE: 73 MMHG | SYSTOLIC BLOOD PRESSURE: 132 MMHG

## 2024-11-25 DIAGNOSIS — D32.9 BENIGN NEOPLASM OF MENINGES, UNSPECIFIED: ICD-10-CM

## 2024-11-25 PROCEDURE — 99213 OFFICE O/P EST LOW 20 MIN: CPT

## 2024-11-25 NOTE — PHYSICAL EXAM
[General Appearance - Alert] : alert [General Appearance - In No Acute Distress] : in no acute distress [Oriented To Time, Place, And Person] : oriented to person, place, and time [Person] : oriented to person [Place] : oriented to place [Time] : oriented to time [Cranial Nerves Oculomotor (III)] : extraocular motion intact [Cranial Nerves Trigeminal (V)] : facial sensation intact symmetrically [Cranial Nerves Facial (VII)] : face symmetrical [Cranial Nerves Accessory (XI - Cranial And Spinal)] : head turning and shoulder shrug symmetric [Cranial Nerves Hypoglossal (XII)] : there was no tongue deviation with protrusion [Motor Strength] : muscle strength was normal in all four extremities [Sensation Tactile Decrease] : light touch was intact [Balance] : balance was intact [Sclera] : the sclera and conjunctiva were normal [Outer Ear] : the ears and nose were normal in appearance [Neck Appearance] : the appearance of the neck was normal [] : no respiratory distress [Respiration, Rhythm And Depth] : normal respiratory rhythm and effort [Abnormal Walk] : normal gait [Skin Color & Pigmentation] : normal skin color and pigmentation [FreeTextEntry6] : No pronator drift

## 2024-11-25 NOTE — ASSESSMENT
[FreeTextEntry1] : 64 year old female with left convexity meningioma. She is asymptomatic and has a normal neurological exam. Her MRI appears unchanged when compared to MRI from 2022. Pending official report. Will contact her if any differences noted in meningioma on report read by radiologist.  PLAN: -MRI WAW IV contrast in 1 year

## 2024-11-25 NOTE — HISTORY OF PRESENT ILLNESS
[FreeTextEntry1] : CARISA JEFFERSON is a 64 year old female with known left convexity meningioma has been followed since 2016. She remains asymptomatic. As of last visit in 2022 MRI scan showed no change in size of meningioma.     She is here for follow up today. She remains asymptomatic. She had recent MRI. Meningioma appears unchanged but pending official report.

## 2025-05-09 ENCOUNTER — APPOINTMENT (OUTPATIENT)
Dept: CARDIOLOGY | Facility: CLINIC | Age: 65
End: 2025-05-09
Payer: MEDICARE

## 2025-05-09 VITALS
SYSTOLIC BLOOD PRESSURE: 135 MMHG | HEART RATE: 85 BPM | OXYGEN SATURATION: 96 % | BODY MASS INDEX: 25.15 KG/M2 | DIASTOLIC BLOOD PRESSURE: 70 MMHG | WEIGHT: 142 LBS

## 2025-05-09 DIAGNOSIS — E78.5 HYPERLIPIDEMIA, UNSPECIFIED: ICD-10-CM

## 2025-05-09 DIAGNOSIS — R94.39 ABNORMAL RESULT OF OTHER CARDIOVASCULAR FUNCTION STUDY: ICD-10-CM

## 2025-05-09 DIAGNOSIS — R94.31 ABNORMAL ELECTROCARDIOGRAM [ECG] [EKG]: ICD-10-CM

## 2025-05-09 PROCEDURE — G2211 COMPLEX E/M VISIT ADD ON: CPT

## 2025-05-09 PROCEDURE — G0537: CPT

## 2025-05-09 PROCEDURE — 99204 OFFICE O/P NEW MOD 45 MIN: CPT

## 2025-05-09 RX ORDER — ATORVASTATIN CALCIUM 40 MG/1
40 TABLET, FILM COATED ORAL
Qty: 1 | Refills: 3 | Status: ACTIVE | COMMUNITY
Start: 2025-05-09 | End: 1900-01-01

## 2025-05-09 NOTE — DISCUSSION/SUMMARY
[FreeTextEntry1] : In summary   Alexus, 64 year old with a past medical history of Hyperlipidemia, Abnormal ECG + Mn Systolic Murmur I.VI =============== =============== Hyperlipidemia - Bring nexst appt  Abnormal ECG + Mn Systolic Murmur I.VI  - TTE       Saddar, as always, kind thanks for the referral.   Aniket Carmona MD [facility] NIGEL PINON Director, Preventive Cardiology & Lipidology [facility] [place]                                                                                                                                                                                                                                                                                                                                                                                                                                                                                                                                                                                                                                                                                                                           45 minutes spent in patient encounter explaining and formulating rationale for treatment plan. >50% of time spent in direct counseling reviewing all tests, labs, and imaging and conferring with patient, family member, and other physicians regarding patient care >50% of time spent in direct counseling reviewing all tests, labs, and imaging and conferring with patient, family member, and other physicians regarding patient care

## 2025-05-09 NOTE — HISTORY OF PRESENT ILLNESS
[FreeTextEntry1] : Dear Malia,   I had the pleasure of seeing your patient CARISA JEFFERSON for Cardiometabolic evaluation.   As you know, she  is a Pleasant, 64 year old with a past medical history of Hyperlipidemia, Abnormal ECG + Mn Systolic Murmur I.VI =============== =============== Hyperlipidemia - Bring nexst appt  Abnormal ECG + Mn Systolic Murmur I.VI  - TTE    ---------------------------------------------------------------------------- ---------------------------------------------------------------------------- 5-2025 CC: Heart Issues - Patient reports no chest pain, no localization to the sternum, no radiation to the neck/jaw, no alleviating nor worsening precipitants to CP, no assoc symptoms to CP - Patient notes no associated SOB/Palps/Leg swelling - Reports No associated F/C/N/V/Headaches - Reports Normal Exercise Tolerance - Reports no medication changes - Reports normal mood/quality of life - Reports no associated midnight awakenings from cP - Reports no diet changes - Reports no associated body aches- Reports no recent colds/viruses- Recent labs/imaging reviewed- Relevant Family history reviewed Mother/Father - CVRisk Assessment for 10 Year ACC/AHA Pooled Risk Cohort Equation places this person at < 7.5% Risk of ASCVD TTE -- 6-2024 - LVEF  Nl  mg/dL ; A1c 6.2  CT Calcium Score

## 2025-05-09 NOTE — DISCUSSION/SUMMARY
[FreeTextEntry1] : In summary   Alexus, 64 year old with a past medical history of Hyperlipidemia, Abnormal ECG + Mn Systolic Murmur I.VI =============== =============== Hyperlipidemia - Bring nexst appt  Abnormal ECG + Mn Systolic Murmur I.VI  - TTE       Saddar, as always, kind thanks for the referral.   Aniket Carmona MD [facility] NIGEL PINON Director, Preventive Cardiology & Lipidology [facility] [place]                                                                                                                                                                                                                                                                                                                                                                                                                                                                                                                                                                                                                                                                                                                           45 minutes spent in patient encounter explaining and formulating rationale for treatment plan. >50% of time spent in direct counseling reviewing all tests, labs, and imaging and conferring with patient, family member, and other physicians regarding patient care >50% of time spent in direct counseling reviewing all tests, labs, and imaging and conferring with patient, family member, and other physicians regarding patient care

## 2025-05-12 ENCOUNTER — NON-APPOINTMENT (OUTPATIENT)
Age: 65
End: 2025-05-12

## 2025-05-14 ENCOUNTER — APPOINTMENT (OUTPATIENT)
Dept: ORTHOPEDIC SURGERY | Facility: CLINIC | Age: 65
End: 2025-05-14
Payer: MEDICARE

## 2025-05-14 DIAGNOSIS — M65.311 TRIGGER THUMB, RIGHT THUMB: ICD-10-CM

## 2025-05-14 DIAGNOSIS — D21.11 BENIGN NEOPLASM OF CONNECTIVE AND OTHER SOFT TISSUE OF RIGHT UPPER LIMB, INCLUDING SHOULDER: ICD-10-CM

## 2025-05-14 DIAGNOSIS — G56.02 CARPAL TUNNEL SYNDROME, LEFT UPPER LIMB: ICD-10-CM

## 2025-05-14 PROCEDURE — 99203 OFFICE O/P NEW LOW 30 MIN: CPT | Mod: 25

## 2025-05-14 PROCEDURE — 20550 NJX 1 TENDON SHEATH/LIGAMENT: CPT | Mod: RT

## 2025-05-14 RX ORDER — BETAMETHA AC,SOD PHOS/WATER/PF 6 MG/ML
6 (3-3) VIAL (ML) INJECTION
Qty: 2 | Refills: 0 | Status: COMPLETED | OUTPATIENT
Start: 2025-05-14

## 2025-05-14 RX ORDER — LIDOCAINE HCL/PF 10 MG/ML
1 VIAL (ML) INJECTION
Refills: 0 | Status: COMPLETED | OUTPATIENT
Start: 2025-05-14

## 2025-05-14 RX ADMIN — LIDOCAINE HYDROCHLORIDE 0.5 %: 10 INJECTION, SOLUTION INFILTRATION; PERINEURAL at 00:00

## 2025-05-14 RX ADMIN — BETAMETHASONE ACETATE AND BETAMETHASONE SODIUM PHOSPHATE 1 MG/ML: 3; 3 INJECTION, SUSPENSION INTRA-ARTICULAR; INTRALESIONAL; INTRAMUSCULAR; SOFT TISSUE at 00:00

## 2025-05-14 NOTE — HISTORY OF PRESENT ILLNESS
[Right] : right hand dominant [FreeTextEntry1] : She comes in today for evaluation of right thumb pain which started 2 weeks ago. Not related to an injury. She rates her pain as a 9/19 and has clicking. She denies any radiating pain. She states that she had a cortisone injection a few years ago.   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  She did well in this regard.  She saw Dr. Payne greater than 1 year ago and was given cortisone injections of both carpal tunnels.

## 2025-05-14 NOTE — END OF VISIT
Fax received from [facility]~  Juanita has been having increased urination with scant amount/odor.  We dipped her urine today and it was positive for nitrites.    Would you like to have urine sent to lab for UA  C&S if indicated.  Yes per Dr. Dee signed fax.    [FreeTextEntry3] : This note was written by Jc Smith on 05/14/2025 acting solely as a scribe for Dr. Harry Looney.   All medical record entries made by the Scribe were at my, Dr. Harry Looney, direction and personally dictated by me on 05/14/2025. I have personally reviewed the chart and agree that the record accurately reflects my personal performance of the history, physical exam, assessment and plan.

## 2025-05-14 NOTE — END OF VISIT
[FreeTextEntry3] : This note was written by Jc Smith on 05/14/2025 acting solely as a scribe for Dr. Harry Looney.   All medical record entries made by the Scribe were at my, Dr. Harry Looney, direction and personally dictated by me on 05/14/2025. I have personally reviewed the chart and agree that the record accurately reflects my personal performance of the history, physical exam, assessment and plan.

## 2025-05-14 NOTE — PHYSICAL EXAM
[de-identified] : - Constitutional: This is a female in no obvious distress.   - Psych: Patient is alert and oriented to person, place and time.  Patient has a normal mood and affect. - Cardiovascular: Normal pulses throughout the upper extremities.  No significant varicosities are noted in the upper extremities.  - Neuro: Strength and sensation are intact throughout the upper extremities.  Patient has normal coordination  ---   -  Side: Right Thumb -  There is swelling and tenderness along the A-1 pulley of the thumb.  -  There is evidence of active triggering with flexion and extension of the thumb. -  There is no evidence of an IP joint flexion contracture.  -  There is no tenderness along the MCP or CMC joints of the thumb.  -  There is no tenderness along the first dorsal compartment.  -  There is no evidence of triggering of the adjacent digits. -  There is full range-of-motion of the other digits into the palm. -  Provocative signs for carpal tunnel syndrome are negative. -  There is normal strength and sensation distally along the radial, ulnar and median nerve distributions.

## 2025-05-14 NOTE — DISCUSSION/SUMMARY
[FreeTextEntry1] : She has findings consistent with a right trigger thumb.  I had a discussion regarding today's visit, the prognosis of this diagnosis and treatment recommendations and options.  At this time, I recommended a cortisone injection, which she agreed to proceed with at the right trigger thumb.   The patient has agreed to this plan of management and has expressed full understanding.  All questions were fully answered to the patient's satisfaction.  My cumulative time spent on this patient's visit included: Preparation for the visit, review of the medical records, review of pertinent diagnostic studies, examination and counseling of the patient on the above diagnosis, treatment plan and prognosis, orders of diagnostic tests, medications and/or appropriate procedures and documentation in the medical records of today's visit.

## 2025-05-14 NOTE — ADDENDUM
[FreeTextEntry1] :  I, Jc Smith, acted solely as a scribe for Dr. Looney on this date on 05/14/2025.

## 2025-05-14 NOTE — PROCEDURE
[FreeTextEntry1] : -  After a discussion of risks and benefits, the patient agreed to proceed with a cortisone injection.  -  Side: Right -  Finger: trigger thumb -  Medications: 0.5 cc of 1% Lidocaine and 1 cc of Celestone Soluspan, 6mg/cc, using sterile technique. -  Patient tolerated the procedure well, without complications. -  Patient was told that the symptoms may worsen for a day or two, and should then begin to improve. -  Instructions: Patient was instructed on activity modification for the next several days. -  Follow-up: Within 4 weeks to assess response to the injection.

## 2025-05-14 NOTE — PHYSICAL EXAM
[de-identified] : - Constitutional: This is a female in no obvious distress.   - Psych: Patient is alert and oriented to person, place and time.  Patient has a normal mood and affect. - Cardiovascular: Normal pulses throughout the upper extremities.  No significant varicosities are noted in the upper extremities.  - Neuro: Strength and sensation are intact throughout the upper extremities.  Patient has normal coordination  ---   -  Side: Right Thumb -  There is swelling and tenderness along the A-1 pulley of the thumb.  -  There is evidence of active triggering with flexion and extension of the thumb. -  There is no evidence of an IP joint flexion contracture.  -  There is no tenderness along the MCP or CMC joints of the thumb.  -  There is no tenderness along the first dorsal compartment.  -  There is no evidence of triggering of the adjacent digits. -  There is full range-of-motion of the other digits into the palm. -  Provocative signs for carpal tunnel syndrome are negative. -  There is normal strength and sensation distally along the radial, ulnar and median nerve distributions.

## 2025-06-03 NOTE — ASU PREOP CHECKLIST - DNR CLARIFICATION FORM COMPLETED
Attempted to reach patient to schedule f/u w/ Dr Briceño and Heather Pierre. LVM requesting patient call clinic back to schedule.    n/a

## 2025-06-05 ENCOUNTER — NON-APPOINTMENT (OUTPATIENT)
Age: 65
End: 2025-06-05

## 2025-06-18 ENCOUNTER — APPOINTMENT (OUTPATIENT)
Dept: ORTHOPEDIC SURGERY | Facility: CLINIC | Age: 65
End: 2025-06-18
Payer: MEDICARE

## 2025-06-18 PROCEDURE — 99213 OFFICE O/P EST LOW 20 MIN: CPT

## 2025-06-18 NOTE — HISTORY OF PRESENT ILLNESS
[FreeTextEntry1] : 5 weeks status post right trigger thumb cortisone injection #1  She is overall doing well today.   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  She did well in this regard.

## 2025-06-18 NOTE — PHYSICAL EXAM
[de-identified] : - Constitutional: This is a female in no obvious distress.   - Psych: Patient is alert and oriented to person, place and time.  Patient has a normal mood and affect. - Cardiovascular: Normal pulses throughout the upper extremities.  No significant varicosities are noted in the upper extremities.  - Neuro: Strength and sensation are intact throughout the upper extremities.  Patient has normal coordination  ---   Examination of her right thumb demonstrates no further swelling.  There is minimal tenderness along the A1 pulley with minimal clicking without triggering.  She has full flexion and extension.  She remains neurovascularly intact distally.

## 2025-06-18 NOTE — PHYSICAL EXAM
[de-identified] : - Constitutional: This is a female in no obvious distress.   - Psych: Patient is alert and oriented to person, place and time.  Patient has a normal mood and affect. - Cardiovascular: Normal pulses throughout the upper extremities.  No significant varicosities are noted in the upper extremities.  - Neuro: Strength and sensation are intact throughout the upper extremities.  Patient has normal coordination  ---   Examination of her right thumb demonstrates no further swelling.  There is minimal tenderness along the A1 pulley with minimal clicking without triggering.  She has full flexion and extension.  She remains neurovascularly intact distally.

## 2025-06-18 NOTE — DISCUSSION/SUMMARY
[FreeTextEntry1] : I had a discussion regarding today's visit, the diagnosis and treatment recommendations and options.  We also discussed changes since the last visit.  At this time, I recommended observation as her symptoms are mild. If she develops any pain or swelling or recurrent triggering, she will follow up for a repeat cortisone injection.   The patient has agreed to the above plan of management and has expressed full understanding.  All questions were fully answered to the patient's satisfaction.  My cumulative time spent on today's visit included: Preparation for the visit, review of the medical records, review of pertinent diagnostic studies, examination and counseling of the patient on the above diagnosis, treatment plan and prognosis, orders of diagnostic tests, medications and/or appropriate procedures and documentation in the medical records of today's visit.

## 2025-08-25 ENCOUNTER — EMERGENCY (EMERGENCY)
Facility: HOSPITAL | Age: 65
LOS: 1 days | End: 2025-08-25
Attending: STUDENT IN AN ORGANIZED HEALTH CARE EDUCATION/TRAINING PROGRAM
Payer: COMMERCIAL

## 2025-08-25 VITALS
DIASTOLIC BLOOD PRESSURE: 73 MMHG | OXYGEN SATURATION: 100 % | HEART RATE: 113 BPM | TEMPERATURE: 98 F | WEIGHT: 141.98 LBS | RESPIRATION RATE: 20 BRPM | SYSTOLIC BLOOD PRESSURE: 123 MMHG | HEIGHT: 63 IN

## 2025-08-25 VITALS
DIASTOLIC BLOOD PRESSURE: 76 MMHG | OXYGEN SATURATION: 98 % | TEMPERATURE: 98 F | RESPIRATION RATE: 16 BRPM | HEART RATE: 78 BPM | SYSTOLIC BLOOD PRESSURE: 137 MMHG

## 2025-08-25 DIAGNOSIS — Z98.51 TUBAL LIGATION STATUS: Chronic | ICD-10-CM

## 2025-08-25 LAB
ALBUMIN SERPL ELPH-MCNC: 3.9 G/DL — SIGNIFICANT CHANGE UP (ref 3.3–5)
ALP SERPL-CCNC: 121 U/L — HIGH (ref 40–120)
ALT FLD-CCNC: 14 U/L — SIGNIFICANT CHANGE UP (ref 10–45)
ANION GAP SERPL CALC-SCNC: 16 MMOL/L — SIGNIFICANT CHANGE UP (ref 5–17)
APPEARANCE UR: CLEAR — SIGNIFICANT CHANGE UP
AST SERPL-CCNC: 19 U/L — SIGNIFICANT CHANGE UP (ref 10–40)
BACTERIA # UR AUTO: ABNORMAL /HPF
BASOPHILS # BLD AUTO: 0.03 K/UL — SIGNIFICANT CHANGE UP (ref 0–0.2)
BASOPHILS NFR BLD AUTO: 0.3 % — SIGNIFICANT CHANGE UP (ref 0–2)
BILIRUB SERPL-MCNC: 0.4 MG/DL — SIGNIFICANT CHANGE UP (ref 0.2–1.2)
BILIRUB UR-MCNC: NEGATIVE — SIGNIFICANT CHANGE UP
BUN SERPL-MCNC: 17 MG/DL — SIGNIFICANT CHANGE UP (ref 7–23)
CALCIUM SERPL-MCNC: 9.7 MG/DL — SIGNIFICANT CHANGE UP (ref 8.4–10.5)
CAST: 2 /LPF — SIGNIFICANT CHANGE UP (ref 0–4)
CHLORIDE SERPL-SCNC: 100 MMOL/L — SIGNIFICANT CHANGE UP (ref 96–108)
CO2 SERPL-SCNC: 22 MMOL/L — SIGNIFICANT CHANGE UP (ref 22–31)
COLOR SPEC: YELLOW — SIGNIFICANT CHANGE UP
CREAT SERPL-MCNC: 1.05 MG/DL — SIGNIFICANT CHANGE UP (ref 0.5–1.3)
DIFF PNL FLD: NEGATIVE — SIGNIFICANT CHANGE UP
EGFR: 59 ML/MIN/1.73M2 — LOW
EGFR: 59 ML/MIN/1.73M2 — LOW
EOSINOPHIL # BLD AUTO: 0.21 K/UL — SIGNIFICANT CHANGE UP (ref 0–0.5)
EOSINOPHIL NFR BLD AUTO: 1.9 % — SIGNIFICANT CHANGE UP (ref 0–6)
GAS PNL BLDV: SIGNIFICANT CHANGE UP
GLUCOSE SERPL-MCNC: 194 MG/DL — HIGH (ref 70–99)
GLUCOSE UR QL: NEGATIVE MG/DL — SIGNIFICANT CHANGE UP
HCT VFR BLD CALC: 39.4 % — SIGNIFICANT CHANGE UP (ref 34.5–45)
HGB BLD-MCNC: 12.5 G/DL — SIGNIFICANT CHANGE UP (ref 11.5–15.5)
IMM GRANULOCYTES # BLD AUTO: 0.04 K/UL — SIGNIFICANT CHANGE UP (ref 0–0.07)
IMM GRANULOCYTES NFR BLD AUTO: 0.4 % — SIGNIFICANT CHANGE UP (ref 0–0.9)
KETONES UR QL: NEGATIVE MG/DL — SIGNIFICANT CHANGE UP
LEUKOCYTE ESTERASE UR-ACNC: ABNORMAL
LYMPHOCYTES # BLD AUTO: 2.07 K/UL — SIGNIFICANT CHANGE UP (ref 1–3.3)
LYMPHOCYTES NFR BLD AUTO: 19 % — SIGNIFICANT CHANGE UP (ref 13–44)
MCHC RBC-ENTMCNC: 29.5 PG — SIGNIFICANT CHANGE UP (ref 27–34)
MCHC RBC-ENTMCNC: 31.7 G/DL — LOW (ref 32–36)
MCV RBC AUTO: 92.9 FL — SIGNIFICANT CHANGE UP (ref 80–100)
MONOCYTES # BLD AUTO: 0.95 K/UL — HIGH (ref 0–0.9)
MONOCYTES NFR BLD AUTO: 8.7 % — SIGNIFICANT CHANGE UP (ref 2–14)
NEUTROPHILS # BLD AUTO: 7.6 K/UL — HIGH (ref 1.8–7.4)
NEUTROPHILS NFR BLD AUTO: 69.7 % — SIGNIFICANT CHANGE UP (ref 43–77)
NITRITE UR-MCNC: POSITIVE
NRBC # BLD AUTO: 0 K/UL — SIGNIFICANT CHANGE UP (ref 0–0)
NRBC # FLD: 0 K/UL — SIGNIFICANT CHANGE UP (ref 0–0)
NRBC BLD AUTO-RTO: 0 /100 WBCS — SIGNIFICANT CHANGE UP (ref 0–0)
PH UR: 5.5 — SIGNIFICANT CHANGE UP (ref 5–8)
PLATELET # BLD AUTO: 224 K/UL — SIGNIFICANT CHANGE UP (ref 150–400)
PMV BLD: 9.1 FL — SIGNIFICANT CHANGE UP (ref 7–13)
POTASSIUM SERPL-MCNC: 3.7 MMOL/L — SIGNIFICANT CHANGE UP (ref 3.5–5.3)
POTASSIUM SERPL-SCNC: 3.7 MMOL/L — SIGNIFICANT CHANGE UP (ref 3.5–5.3)
PROT SERPL-MCNC: 7 G/DL — SIGNIFICANT CHANGE UP (ref 6–8.3)
PROT UR-MCNC: NEGATIVE MG/DL — SIGNIFICANT CHANGE UP
RBC # BLD: 4.24 M/UL — SIGNIFICANT CHANGE UP (ref 3.8–5.2)
RBC # FLD: 13.2 % — SIGNIFICANT CHANGE UP (ref 10.3–14.5)
RBC CASTS # UR COMP ASSIST: 1 /HPF — SIGNIFICANT CHANGE UP (ref 0–4)
SODIUM SERPL-SCNC: 138 MMOL/L — SIGNIFICANT CHANGE UP (ref 135–145)
SP GR SPEC: 1.02 — SIGNIFICANT CHANGE UP (ref 1–1.03)
SQUAMOUS # UR AUTO: 1 /HPF — SIGNIFICANT CHANGE UP (ref 0–5)
UROBILINOGEN FLD QL: 0.2 MG/DL — SIGNIFICANT CHANGE UP (ref 0.2–1)
WBC # BLD: 10.9 K/UL — HIGH (ref 3.8–10.5)
WBC # FLD AUTO: 10.9 K/UL — HIGH (ref 3.8–10.5)
WBC UR QL: 83 /HPF — HIGH (ref 0–5)

## 2025-08-25 PROCEDURE — 82947 ASSAY GLUCOSE BLOOD QUANT: CPT

## 2025-08-25 PROCEDURE — 76705 ECHO EXAM OF ABDOMEN: CPT

## 2025-08-25 PROCEDURE — 85014 HEMATOCRIT: CPT

## 2025-08-25 PROCEDURE — 82435 ASSAY OF BLOOD CHLORIDE: CPT

## 2025-08-25 PROCEDURE — 83605 ASSAY OF LACTIC ACID: CPT

## 2025-08-25 PROCEDURE — 81001 URINALYSIS AUTO W/SCOPE: CPT

## 2025-08-25 PROCEDURE — 93010 ELECTROCARDIOGRAM REPORT: CPT

## 2025-08-25 PROCEDURE — 84132 ASSAY OF SERUM POTASSIUM: CPT

## 2025-08-25 PROCEDURE — 84295 ASSAY OF SERUM SODIUM: CPT

## 2025-08-25 PROCEDURE — 85025 COMPLETE CBC W/AUTO DIFF WBC: CPT

## 2025-08-25 PROCEDURE — 96374 THER/PROPH/DIAG INJ IV PUSH: CPT | Mod: XU

## 2025-08-25 PROCEDURE — 87186 SC STD MICRODIL/AGAR DIL: CPT

## 2025-08-25 PROCEDURE — 76705 ECHO EXAM OF ABDOMEN: CPT | Mod: 26

## 2025-08-25 PROCEDURE — 85018 HEMOGLOBIN: CPT

## 2025-08-25 PROCEDURE — 99285 EMERGENCY DEPT VISIT HI MDM: CPT | Mod: 25

## 2025-08-25 PROCEDURE — 80053 COMPREHEN METABOLIC PANEL: CPT

## 2025-08-25 PROCEDURE — 74177 CT ABD & PELVIS W/CONTRAST: CPT

## 2025-08-25 PROCEDURE — 96375 TX/PRO/DX INJ NEW DRUG ADDON: CPT

## 2025-08-25 PROCEDURE — 87086 URINE CULTURE/COLONY COUNT: CPT

## 2025-08-25 PROCEDURE — 74177 CT ABD & PELVIS W/CONTRAST: CPT | Mod: 26

## 2025-08-25 PROCEDURE — 93005 ELECTROCARDIOGRAM TRACING: CPT

## 2025-08-25 PROCEDURE — 82330 ASSAY OF CALCIUM: CPT

## 2025-08-25 PROCEDURE — 99285 EMERGENCY DEPT VISIT HI MDM: CPT

## 2025-08-25 PROCEDURE — 82803 BLOOD GASES ANY COMBINATION: CPT

## 2025-08-25 RX ORDER — AMOXICILLIN AND CLAVULANATE POTASSIUM 500; 125 MG/1; MG/1
1 TABLET, FILM COATED ORAL
Qty: 20 | Refills: 0
Start: 2025-08-25 | End: 2025-09-03

## 2025-08-25 RX ORDER — ACETAMINOPHEN 500 MG/5ML
1000 LIQUID (ML) ORAL ONCE
Refills: 0 | Status: COMPLETED | OUTPATIENT
Start: 2025-08-25 | End: 2025-08-25

## 2025-08-25 RX ORDER — METRONIDAZOLE 250 MG
500 TABLET ORAL ONCE
Refills: 0 | Status: COMPLETED | OUTPATIENT
Start: 2025-08-25 | End: 2025-08-25

## 2025-08-25 RX ORDER — CEFTRIAXONE 500 MG/1
1000 INJECTION, POWDER, FOR SOLUTION INTRAMUSCULAR; INTRAVENOUS ONCE
Refills: 0 | Status: COMPLETED | OUTPATIENT
Start: 2025-08-25 | End: 2025-08-25

## 2025-08-25 RX ORDER — AMOXICILLIN AND CLAVULANATE POTASSIUM 500; 125 MG/1; MG/1
1 TABLET, FILM COATED ORAL
Qty: 16 | Refills: 0
Start: 2025-08-25 | End: 2025-09-01

## 2025-08-25 RX ADMIN — Medication 400 MILLIGRAM(S): at 16:40

## 2025-08-25 RX ADMIN — Medication 1000 MILLILITER(S): at 16:40

## 2025-08-25 RX ADMIN — Medication 100 MILLIGRAM(S): at 17:45

## 2025-08-25 RX ADMIN — CEFTRIAXONE 100 MILLIGRAM(S): 500 INJECTION, POWDER, FOR SOLUTION INTRAMUSCULAR; INTRAVENOUS at 17:10

## 2025-08-28 LAB
-  AMOXICILLIN/CLAVULANIC ACID: SIGNIFICANT CHANGE UP
-  AMPICILLIN/SULBACTAM: SIGNIFICANT CHANGE UP
-  AMPICILLIN: SIGNIFICANT CHANGE UP
-  AZTREONAM: SIGNIFICANT CHANGE UP
-  CEFAZOLIN: SIGNIFICANT CHANGE UP
-  CEFEPIME: SIGNIFICANT CHANGE UP
-  CEFOXITIN: SIGNIFICANT CHANGE UP
-  CEFTRIAXONE: SIGNIFICANT CHANGE UP
-  CEFUROXIME: SIGNIFICANT CHANGE UP
-  CIPROFLOXACIN: SIGNIFICANT CHANGE UP
-  ERTAPENEM: SIGNIFICANT CHANGE UP
-  GENTAMICIN: SIGNIFICANT CHANGE UP
-  IMIPENEM: SIGNIFICANT CHANGE UP
-  LEVOFLOXACIN: SIGNIFICANT CHANGE UP
-  MEROPENEM: SIGNIFICANT CHANGE UP
-  NITROFURANTOIN: SIGNIFICANT CHANGE UP
-  PIPERACILLIN/TAZOBACTAM: SIGNIFICANT CHANGE UP
-  TIGECYCLINE: SIGNIFICANT CHANGE UP
-  TOBRAMYCIN: SIGNIFICANT CHANGE UP
-  TRIMETHOPRIM/SULFAMETHOXAZOLE: SIGNIFICANT CHANGE UP
CULTURE RESULTS: ABNORMAL
METHOD TYPE: SIGNIFICANT CHANGE UP
ORGANISM # SPEC MICROSCOPIC CNT: ABNORMAL
ORGANISM # SPEC MICROSCOPIC CNT: ABNORMAL
SPECIMEN SOURCE: SIGNIFICANT CHANGE UP

## 2025-08-29 RX ORDER — CEPHALEXIN 250 MG/1
1 CAPSULE ORAL
Qty: 10 | Refills: 0
Start: 2025-08-29 | End: 2025-09-02

## (undated) DEVICE — CONTAINER SPECIMEN PET

## (undated) DEVICE — DRSG WEBRIL 3"

## (undated) DEVICE — PACK UPPER EXTREMITY

## (undated) DEVICE — CUFF TOURNIQUET 18" DUAL PORT W PLC

## (undated) DEVICE — POSITIONER STRAP ARMBOARD VELCRO TS-30 12

## (undated) DEVICE — GLV 7 PROTEXIS

## (undated) DEVICE — DRAPE 3/4 SHEET 52X76"

## (undated) DEVICE — SUT MONOSOF 5-0 18" P-13

## (undated) DEVICE — DRSG ESMARK 4"

## (undated) DEVICE — DRSG ACE BANDAGE 4"

## (undated) DEVICE — NDL HYPO REGULAR BEVEL 25G X 1.5"

## (undated) DEVICE — DRAPE SURGICAL #1010

## (undated) DEVICE — GLV 7.5 ESTEEM BLUE

## (undated) DEVICE — WRAP COMPRESSION CALF MED

## (undated) DEVICE — BLANKET WARMER LOWER ADULT

## (undated) DEVICE — POSITIONER FOAM HEAD CRADLE

## (undated) DEVICE — DRAPE TOWEL BLUE 17" X 24"